# Patient Record
Sex: FEMALE | Race: WHITE | NOT HISPANIC OR LATINO | Employment: OTHER | URBAN - METROPOLITAN AREA
[De-identification: names, ages, dates, MRNs, and addresses within clinical notes are randomized per-mention and may not be internally consistent; named-entity substitution may affect disease eponyms.]

---

## 2018-08-02 ENCOUNTER — RECORDS - HEALTHEAST (OUTPATIENT)
Dept: LAB | Facility: CLINIC | Age: 68
End: 2018-08-02

## 2018-08-02 LAB
FASTING STATUS PATIENT QL REPORTED: NORMAL
HOMOCYSTEINE PLASMA - HISTORICAL: 13 UMOL/L (ref 0–13)
T4 FREE SERPL-MCNC: 0.9 NG/DL (ref 0.7–1.8)
TSH SERPL DL<=0.005 MIU/L-ACNC: 0.71 UIU/ML (ref 0.3–5)
VIT B12 SERPL-MCNC: 392 PG/ML (ref 213–816)

## 2018-08-04 LAB — COPPER SERPL-MCNC: 83 UG/DL (ref 80–155)

## 2018-08-05 LAB — METHYLMALONATE SERPL-SCNC: 0.17 UMOL/L (ref 0–0.4)

## 2018-08-06 LAB
ANNOTATION COMMENT IMP: NORMAL
CERULOPLASMIN SERPL-MCNC: 24 MG/DL (ref 23–53)
MISCELLANEOUS TEST DEPT. - HE HISTORICAL: NORMAL
PERFORMING LAB: NORMAL
SPECIMEN STATUS: NORMAL
TEST NAME: NORMAL
VIT A SERPL-MCNC: 0.42 MG/L (ref 0.3–1.2)
VITAMIN A (RETINYL PALMITATE): <0.02 MG/L (ref 0–0.1)

## 2018-08-08 LAB
ACHR BIND IGG+IGM SER IA-SCNC: NORMAL NMOL/L
AMPHIPHYSIN AB TITR SER: NEGATIVE TITER
CV2 IGG TITR SER: NEGATIVE TITER
GLIAL NUC TYPE 1 AB TITR SER: NEGATIVE TITER
HU1 AB TITR SER: NEGATIVE TITER
HU2 AB TITR SER IF: NEGATIVE TITER
HU3 AB TITR SER: NEGATIVE TITER
IMMUNOLOGIST REVIEW: NORMAL
LABORATORY COMMENT REPORT: NORMAL
NACHR AB SER-SCNC: 0 NMOL/L
PCA-1 AB TITR SER: NEGATIVE TITER
PCA-2 AB TITR SER: NEGATIVE TITER
PCA-TR AB TITR SER IF: NEGATIVE TITER
STRIA MUS AB TITR SER: NEGATIVE TITER
VGCC-N BIND AB SER-SCNC: 0 NMOL/L
VGCC-P/Q BIND AB SER-SCNC: 0 NMOL/L
VGKC AB SER-SCNC: 0 NMOL/L

## 2018-10-12 ENCOUNTER — RECORDS - HEALTHEAST (OUTPATIENT)
Dept: LAB | Facility: CLINIC | Age: 68
End: 2018-10-12

## 2018-10-15 LAB
ERYTHROCYTE [DISTWIDTH] IN BLOOD BY AUTOMATED COUNT: 12.4 % (ref 11–14.5)
HCT VFR BLD AUTO: 40.1 % (ref 35–47)
HGB BLD-MCNC: 12.6 G/DL (ref 12–16)
MCH RBC QN AUTO: 30.1 PG (ref 27–34)
MCHC RBC AUTO-ENTMCNC: 31.4 G/DL (ref 32–36)
MCV RBC AUTO: 96 FL (ref 80–100)
PLATELET # BLD AUTO: 195 THOU/UL (ref 140–440)
PMV BLD AUTO: 12.5 FL (ref 8.5–12.5)
RBC # BLD AUTO: 4.19 MILL/UL (ref 3.8–5.4)
WBC: 7 THOU/UL (ref 4–11)

## 2018-10-22 ENCOUNTER — RECORDS - HEALTHEAST (OUTPATIENT)
Dept: LAB | Facility: CLINIC | Age: 68
End: 2018-10-22

## 2018-10-22 LAB
ANION GAP SERPL CALCULATED.3IONS-SCNC: 8 MMOL/L (ref 5–18)
BUN SERPL-MCNC: 33 MG/DL (ref 8–22)
CALCIUM SERPL-MCNC: 9.1 MG/DL (ref 8.5–10.5)
CHLORIDE BLD-SCNC: 108 MMOL/L (ref 98–107)
CO2 SERPL-SCNC: 27 MMOL/L (ref 22–31)
CREAT SERPL-MCNC: 0.66 MG/DL (ref 0.6–1.1)
GFR SERPL CREATININE-BSD FRML MDRD: >60 ML/MIN/1.73M2
GLUCOSE BLD-MCNC: 77 MG/DL (ref 70–125)
POTASSIUM BLD-SCNC: 4.1 MMOL/L (ref 3.5–5)
SODIUM SERPL-SCNC: 143 MMOL/L (ref 136–145)

## 2018-12-10 ENCOUNTER — RECORDS - HEALTHEAST (OUTPATIENT)
Dept: LAB | Facility: CLINIC | Age: 68
End: 2018-12-10

## 2018-12-10 LAB
ERYTHROCYTE [SEDIMENTATION RATE] IN BLOOD BY WESTERGREN METHOD: 2 MM/HR (ref 0–20)
FOLATE SERPL-MCNC: 10.1 NG/ML
HIV 1+2 AB+HIV1 P24 AG SERPL QL IA: NEGATIVE
T4 FREE SERPL-MCNC: 1 NG/DL (ref 0.7–1.8)
TSH SERPL DL<=0.005 MIU/L-ACNC: 0.52 UIU/ML (ref 0.3–5)
VIT B12 SERPL-MCNC: 358 PG/ML (ref 213–816)

## 2018-12-11 LAB
25(OH)D3 SERPL-MCNC: 50.4 NG/ML (ref 30–80)
ANA SER QL: 0.1 U

## 2018-12-18 LAB
ACHR BIND IGG+IGM SER IA-SCNC: 0 NMOL/L
AMPHIPHYSIN AB TITR SER: NEGATIVE TITER
CV2 IGG TITR SER: NEGATIVE TITER
GLIAL NUC TYPE 1 AB TITR SER: NEGATIVE TITER
HU1 AB TITR SER: NEGATIVE TITER
HU2 AB TITR SER IF: NEGATIVE TITER
HU3 AB TITR SER: NEGATIVE TITER
IMMUNOLOGIST REVIEW: NORMAL
LABORATORY COMMENT REPORT: NORMAL
NACHR AB SER-SCNC: 0 NMOL/L
PCA-1 AB TITR SER: NEGATIVE TITER
PCA-2 AB TITR SER: NEGATIVE TITER
PCA-TR AB TITR SER IF: NEGATIVE TITER
STRIA MUS AB TITR SER: NEGATIVE TITER
VGCC-N BIND AB SER-SCNC: 0 NMOL/L
VGCC-P/Q BIND AB SER-SCNC: 0 NMOL/L
VGKC AB SER-SCNC: 0 NMOL/L

## 2019-04-23 ENCOUNTER — RECORDS - HEALTHEAST (OUTPATIENT)
Dept: LAB | Facility: CLINIC | Age: 69
End: 2019-04-23

## 2019-04-23 LAB
ANION GAP SERPL CALCULATED.3IONS-SCNC: 6 MMOL/L (ref 5–18)
BUN SERPL-MCNC: 21 MG/DL (ref 8–22)
CALCIUM SERPL-MCNC: 9.5 MG/DL (ref 8.5–10.5)
CHLORIDE BLD-SCNC: 103 MMOL/L (ref 98–107)
CO2 SERPL-SCNC: 29 MMOL/L (ref 22–31)
CREAT SERPL-MCNC: 0.64 MG/DL (ref 0.6–1.1)
GFR SERPL CREATININE-BSD FRML MDRD: >60 ML/MIN/1.73M2
GLUCOSE BLD-MCNC: 77 MG/DL (ref 70–125)
POTASSIUM BLD-SCNC: 4 MMOL/L (ref 3.5–5)
SODIUM SERPL-SCNC: 138 MMOL/L (ref 136–145)

## 2019-05-14 ENCOUNTER — TRANSFERRED RECORDS (OUTPATIENT)
Dept: HEALTH INFORMATION MANAGEMENT | Facility: CLINIC | Age: 69
End: 2019-05-14

## 2019-05-21 NOTE — TELEPHONE ENCOUNTER
RECORDS RECEIVED FROM: External - Dr. Sarah Freeman with [facility]    DATE RECEIVED: 7/23/19   NOTES (FOR ALL VISITS) STATUS DETAILS   OFFICE NOTE from referring provider Received 5/14/19  4/9/19  3/11/19   OFFICE NOTE from other specialist Received Patricia:  5/30/08-7/30/08   DISCHARGE SUMMARY from hospital N/A    DISCHARGE REPORT from the ER N/A    OPERATIVE REPORT N/A    MEDICATION LIST Received     IMAGING  (FOR ALL VISITS)     EMG N/A    EEG N/A    ECT N/A    MRI (HEAD, NECK, SPINE) N/A    LUMBAR PUNCTURE N/A    RYAN Scan N/A    CT (HEAD, NECK, SPINE) N/A       Action    Action Taken Records request faxed to [facility]

## 2019-05-22 NOTE — TELEPHONE ENCOUNTER
Action    Action Taken Records received from [facility] via fax     Per 3/11/19 OV, patient had workup done at [place]. Will fax a records request.

## 2019-05-23 NOTE — TELEPHONE ENCOUNTER
Action    Action Taken Jay requires KATHY from patient to release records. Will call patient       Phone Call:     Contact Name Karen Angel   Outcome Mail box full, not able to leave patient a voicemail.
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
## 2019-07-16 NOTE — TELEPHONE ENCOUNTER
Phone Call:     Contact Name Karenlinda Angel   Outcome Left patient a voicemail to obtain records from [facility]

## 2019-07-18 ENCOUNTER — DOCUMENTATION ONLY (OUTPATIENT)
Dept: CARE COORDINATION | Facility: CLINIC | Age: 69
End: 2019-07-18

## 2019-07-18 NOTE — TELEPHONE ENCOUNTER
Action    Action Taken KATHY received from patient's POA and sister Marge Angel via email.     Action    Action Taken Records request faxed to [facility]

## 2019-07-23 ENCOUNTER — OFFICE VISIT (OUTPATIENT)
Dept: NEUROLOGY | Facility: CLINIC | Age: 69
End: 2019-07-23
Payer: MEDICARE

## 2019-07-23 ENCOUNTER — PRE VISIT (OUTPATIENT)
Dept: NEUROLOGY | Facility: CLINIC | Age: 69
End: 2019-07-23

## 2019-07-23 ENCOUNTER — TELEPHONE (OUTPATIENT)
Dept: NEUROLOGY | Facility: CLINIC | Age: 69
End: 2019-07-23

## 2019-07-23 VITALS — OXYGEN SATURATION: 97 % | DIASTOLIC BLOOD PRESSURE: 77 MMHG | SYSTOLIC BLOOD PRESSURE: 153 MMHG | HEART RATE: 89 BPM

## 2019-07-23 DIAGNOSIS — R25.1 TREMOR: Primary | ICD-10-CM

## 2019-07-23 RX ORDER — CLONAZEPAM 0.5 MG/1
TABLET ORAL
COMMUNITY
End: 2020-01-09

## 2019-07-23 RX ORDER — CHOLECALCIFEROL (VITAMIN D3) 50 MCG
TABLET ORAL
COMMUNITY

## 2019-07-23 RX ORDER — GUAIFENESIN 600 MG/1
TABLET, EXTENDED RELEASE ORAL
COMMUNITY

## 2019-07-23 RX ORDER — TRAZODONE HYDROCHLORIDE 100 MG/1
TABLET ORAL
COMMUNITY

## 2019-07-23 RX ORDER — CITALOPRAM HYDROBROMIDE 20 MG/1
20 TABLET ORAL DAILY
COMMUNITY

## 2019-07-23 RX ORDER — CARBIDOPA AND LEVODOPA 25; 100 MG/1; MG/1
TABLET ORAL
COMMUNITY
End: 2019-09-11

## 2019-07-23 ASSESSMENT — PAIN SCALES - GENERAL: PAINLEVEL: NO PAIN (0)

## 2019-07-23 ASSESSMENT — UNIFIED PARKINSONS DISEASE RATING SCALE (UPDRS): PARKINSONS_MEDS: ON

## 2019-07-23 NOTE — NURSING NOTE
Chief Complaint   Patient presents with     Consult     [facility] NEW - ABNORMAL MOVEMENT, MS AND DEMENTIA       Kashmir Alves, EMT

## 2019-07-23 NOTE — PATIENT INSTRUCTIONS
Plan:  - We will obtain the [facility] MRI images (not just report) of patient's brain and spine (cervical, thoracic, lumbar, if completed).  - We will obtain [facility] records.  - Stop CD/LD (Sinemet) 25/100 mg. Follow the instructions below.   - Stop taking the 8 pm dose x 5 days, then stop the 4 pm dose x 5 days, then stop the 12 pm dose x 5 days, then stop the 8 am dose.  - Referral to our Neurology Pharmacist for medication management.     We will see you back in one month.

## 2019-07-23 NOTE — TELEPHONE ENCOUNTER
Called the [facility] and left a message for imaging/records requesting a call back.

## 2019-07-23 NOTE — LETTER
"[facility]  [facility] NEUR[facility]  [address] [address]  [phone]            2019          Dear Kalyani Proxy Patient,    We received a request to activate you as a proxy for another patient of [facility] or [place].  In order to do so, we need to activate your [employer] account as well.    Your access code is: [unfilled]      Please access the [employer] website:  -  [employer] [email or web address]  -  [employer] [email or web address].    Below the ID and password fields, select the \"Sign Up Now\" as New User.  You will be prompted to enter the access code listed above as well as additional personal information.  Please follow the directions carefully when creating your username and password.    Once your account is activated, you can access the proxy accounts under \"Shared Medical Records\".    If you allow your access code to , or if you have any questions please call a [employer] Representative during normal clinic hours.     Sincerely,        [employer] Customer Service    "

## 2019-07-23 NOTE — TELEPHONE ENCOUNTER
Jeanie from imaging called me back and informed me she will push her cervical spine and brain MRI images to [facility].    Dena in the film room will be watching out for the images. Dena stated I should call tomorrow if the images do not come in today.
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
## 2019-07-24 ENCOUNTER — MYC MEDICAL ADVICE (OUTPATIENT)
Dept: NEUROLOGY | Facility: CLINIC | Age: 69
End: 2019-07-24

## 2019-07-29 ENCOUNTER — DOCUMENTATION ONLY (OUTPATIENT)
Dept: OTHER | Facility: CLINIC | Age: 69
End: 2019-07-29

## 2019-08-08 NOTE — PROGRESS NOTES
"SUBJECTIVE/OBJECTIVE:                           Karen Angel is a 69 year old female called for an initial visit for Medication Therapy Management.  She was referred to me from Dr. Sevilla. Visit conducted with sister, Marge.    Chief Complaint: Initial MTM visit    Allergies/ADRs: None  Tobacco: History of tobacco dependence - quit date was when she was diagnosed with MS  Alcohol: none  Caffeine: caffeine-free diet soda as needed  Activity: walking  PMH: Reviewed in Epic    Medication Adherence/Access:  Patient has assistance with medication administration: assisted living, [facility] - [facility] -[phone]  Patient takes medications 4 time(s) per day.   Per patient, misses medication 0 times per week.   Medication barriers: none.   The patient fills medications at [facility]: NO, fills medications at unknown pharmacy (need to confirm with nursing staff)    Primary progressive MS: Not currently taking any medications. She sees a neurologist at [facility]. The patient is not currently in physical therapy but has been able to walk in her care facility. The hope is that she can continue to live in Assisted Living and not have to move to a Nursing Home yet.     Tremor: Currently tapering carbiodpa-levodopa er visit with Dr. Sevilla in July, as it was determined that the tremor is not due to PD. Sister states their goal is to at least reduce her hand tremor as they were told the head tremor may be more difficult to treat. Sister states the patient is able to care for herself (ie: dress and feed herself). Sister stated: \"The longer she can feed herself the better.\"    Anxiety:  Current medications include: Citalopram 20 mg once daily and clonazepam 0.25 mg twice daily. Sister thinks she has been on citalopram many years and she is unsure if it has been effective.     Insomnia: Currently taking trazodone 100 mg nightly. Sister states that she has had ongoing problems with sleep and she " knows that the staff at Assisted Living still mention this but sister is unsure of what the problems are exactly.     Urinary incontinence: No current medications. Wears adult diapers. Sister doesn't think she's been on medications for incontinence.     Vitamin D deficiency: Taking Vitamin D3 2000 units daily. No lab results available.     ASSESSMENT:                             Current medications were reviewed today as discussed above.     Medication Adherence: excellent, no issues identified    Primary progressive MS: Appears stable. Seems primary concern at this time is tremor.    Tremor: Needs improvement. Patient will continue to taper off levodopa and medications for essential tremor such as primidone or propranolol will be considered at next neurology visit.     Anxiety:  Needs improvement. Patient may benefit from optimization of antidepressants to avoid daily use of benzodiazepines. Will discuss with assisted living staff if this is really how she has been taking clonazepam.      Insomnia: Needs improvement. More information needed from assisted living staff.     Urinary incontinence: Needs improvement. It is possible that the patient may benefit from a trial of mirabegron for incontinence. Would avoid anticholinergics given dementia.     Vitamin D deficiency: Unable to asssess.     PLAN:                            1. Patient will continue to taper off of carbiodpa-levodopa and consider trial of propranolol or primidone at next neurology visit.   2. [facility] pharmacist left message for [facility] RN at assisted living facility to discuss her medications further, especially in regards to anxiety and insomnia.      Future considerations:   1. Trial of Myrbetriq.   2. Decrease clonazepam to as-needed use.    I spent 20 minutes with this patient today. I offer these suggestions for consideration by Dr. Gutierres. A copy of the visit note was provided to the patient's referring provider.    Will follow up in 6  weeks: 9/18/19.    The patient declined a summary of these recommendations as an after visit summary.     Alba Phillips, Pharm.D.  Medication Therapy Management Pharmacist  Phone: [phone]

## 2019-08-09 ENCOUNTER — ALLIED HEALTH/NURSE VISIT (OUTPATIENT)
Dept: PHARMACY | Facility: CLINIC | Age: 69
End: 2019-08-09
Payer: MEDICAID

## 2019-08-09 DIAGNOSIS — E55.9 VITAMIN D DEFICIENCY: ICD-10-CM

## 2019-08-09 DIAGNOSIS — G35 MS (MULTIPLE SCLEROSIS) (H): Primary | ICD-10-CM

## 2019-08-09 DIAGNOSIS — G47.00 INSOMNIA, UNSPECIFIED TYPE: ICD-10-CM

## 2019-08-09 DIAGNOSIS — R25.1 TREMOR: ICD-10-CM

## 2019-08-09 DIAGNOSIS — R32 URINARY INCONTINENCE, UNSPECIFIED TYPE: ICD-10-CM

## 2019-08-09 DIAGNOSIS — F41.9 ANXIETY: ICD-10-CM

## 2019-08-09 PROCEDURE — 99605 MTMS BY PHARM NP 15 MIN: CPT | Performed by: PHARMACIST

## 2019-08-09 PROCEDURE — 99607 MTMS BY PHARM ADDL 15 MIN: CPT | Performed by: PHARMACIST

## 2019-08-09 NOTE — Clinical Note
I had a MTM visit with the patient's sister. She didn't know much about the patient's medications so I'm still awaiting a call back from assisted living staff to discuss further how things are going with the taper off Sinemet, as well as to discuss anxiety/insomnia. I know she is seeing Dr. Gutierres in August and then I will have a call with the sister again in September.

## 2019-09-11 ENCOUNTER — OFFICE VISIT (OUTPATIENT)
Dept: NEUROLOGY | Facility: CLINIC | Age: 69
End: 2019-09-11
Payer: MEDICARE

## 2019-09-11 VITALS
SYSTOLIC BLOOD PRESSURE: 166 MMHG | HEART RATE: 73 BPM | DIASTOLIC BLOOD PRESSURE: 100 MMHG | RESPIRATION RATE: 16 BRPM | OXYGEN SATURATION: 95 %

## 2019-09-11 DIAGNOSIS — G35 MULTIPLE SCLEROSIS (H): ICD-10-CM

## 2019-09-11 DIAGNOSIS — R25.1 TREMOR: ICD-10-CM

## 2019-09-11 DIAGNOSIS — R25.1 TREMOR: Primary | ICD-10-CM

## 2019-09-11 LAB
ALBUMIN SERPL-MCNC: 4.2 G/DL (ref 3.4–5)
ALP SERPL-CCNC: 66 U/L (ref 40–150)
ALT SERPL W P-5'-P-CCNC: 16 U/L (ref 0–50)
ANION GAP SERPL CALCULATED.3IONS-SCNC: 4 MMOL/L (ref 3–14)
AST SERPL W P-5'-P-CCNC: 15 U/L (ref 0–45)
BILIRUB SERPL-MCNC: 0.4 MG/DL (ref 0.2–1.3)
BUN SERPL-MCNC: 31 MG/DL (ref 7–30)
CALCIUM SERPL-MCNC: 9.2 MG/DL (ref 8.5–10.1)
CHLORIDE SERPL-SCNC: 105 MMOL/L (ref 94–109)
CO2 SERPL-SCNC: 28 MMOL/L (ref 20–32)
CREAT SERPL-MCNC: 0.68 MG/DL (ref 0.52–1.04)
ERYTHROCYTE [DISTWIDTH] IN BLOOD BY AUTOMATED COUNT: 12.4 % (ref 10–15)
GFR SERPL CREATININE-BSD FRML MDRD: 89 ML/MIN/{1.73_M2}
GLUCOSE SERPL-MCNC: 87 MG/DL (ref 70–99)
HCT VFR BLD AUTO: 44.4 % (ref 35–47)
HGB BLD-MCNC: 13.4 G/DL (ref 11.7–15.7)
MCH RBC QN AUTO: 29.6 PG (ref 26.5–33)
MCHC RBC AUTO-ENTMCNC: 30.2 G/DL (ref 31.5–36.5)
MCV RBC AUTO: 98 FL (ref 78–100)
PLATELET # BLD AUTO: 197 10E9/L (ref 150–450)
POTASSIUM SERPL-SCNC: 4 MMOL/L (ref 3.4–5.3)
PROT SERPL-MCNC: 7.2 G/DL (ref 6.8–8.8)
RBC # BLD AUTO: 4.52 10E12/L (ref 3.8–5.2)
SODIUM SERPL-SCNC: 138 MMOL/L (ref 133–144)
WBC # BLD AUTO: 8.6 10E9/L (ref 4–11)

## 2019-09-11 RX ORDER — PRIMIDONE 50 MG/1
TABLET ORAL
Qty: 180 TABLET | Refills: 3 | Status: SHIPPED | OUTPATIENT
Start: 2019-09-11 | End: 2022-08-16

## 2019-09-11 ASSESSMENT — PATIENT HEALTH QUESTIONNAIRE - PHQ9: SUM OF ALL RESPONSES TO PHQ QUESTIONS 1-9: 0

## 2019-09-11 ASSESSMENT — PAIN SCALES - GENERAL: PAINLEVEL: NO PAIN (0)

## 2019-09-11 NOTE — PATIENT INSTRUCTIONS
Plan:   - Trial primidone 75 mg one tab at bedtime.   - Start 0.5 tab for 2 weeks, then increase to one tab for 2 weeks, then increase to 1.5 tab and continue on this dose.   - May stop at dose that improves your tremors.   - Check CBC and CMP today. Will need to do blood work every 3 - 6 months to monitor your liver & kidney function and complete blood count.   - Repeat MRI brain with/without contrast.  - Multiple Sclerosis referral to Dr. Irais Wilson for management of multiple sclerosis   - May stop clonazepam if it is not improving symptoms.     I will see you back in 3 months.

## 2019-09-11 NOTE — PROGRESS NOTES
Department of Neurology  Movement Disorders Division     Patient: Karen Angel   MRN: [id number]   : 1950   Date of Visit: 2019     Reason for visit: follow up for tremors    History of Present Illness  Ms. Angel is a 69 year old R handed female that presents to Neurology Movement clinic for follow up regarding proximal tremors. Patient was last seen on 2019 where CD/LD was tapered off as her tremors were not consistent with PD. Further information was needed at that time, including MRI brain, prior to establishing a diagnosis.     History obtained from patient and accompanied by her sister.  Her tremors.  Tremors are mostly in her hands as well as her head and are consistent.  They are not distractible and she is unable to control them.  She is not aware of a pattern to her tremors.  She is currently off of all carbidopa/levodopa.    Patient reports no change in patient reports following with Dr. Sears at [facility], [facility] for management of her multiple sclerosis.  She states that she is currently not on any treatment for her MS.    Review of Systems:  Other than that mentioned above, the remainder of 12 systems reviewed were negative.    Medications:  Current Outpatient Medications   Medication Sig Dispense Refill     citalopram (CELEXA) 20 MG tablet Take 20 mg by mouth daily Take 1 tablet by mouth daily @@ 8am       clonazePAM (KLONOPIN) 0.5 MG tablet Take 1/2 tablet by mouth twice a day @@ 8am and 4pm       guaiFENesin (MUCINEX) 600 MG 12 hr tablet Take 1 tablet twice a day as needed       primidone (MYSOLINE) 50 MG tablet Start with 0.5 tab x 2 weeks, increase to 1 tab x 2 weeks, increase to 1.5 tab and continue on this dose 180 tablet 3     traZODone (DESYREL) 100 MG tablet Take 1 tablet by mouth with food or snack @@ 8pm       vitamin D3 (CHOLECALCIFEROL) 2000 units (50 mcg) tablet Take 1 tablet by mouth daily @@ 8am           Physical Exam:  The patient's   blood pressure is 166/100 (abnormal) and her pulse is 73. Her respiration is 16 and oxygen saturation is 95%.    Gen: alert, active, attentive, appropriately groomed   HEENT: normocephalic, eyes open with no discharge, nares patent, oropharynx clear-no lesions  Chest: normal configuration, chest rise equal b/l, non labored breathing   Extremities: no edema/cyanosis in BUE/BLE, distal pulses intact  Psych: mood stable      Neurologic Exam:  Mental status: Alert and oriented to person, but not place ([place]), time (2009), or situation. Follows commands. Recent and remote memory not intact. Attention span and concentration intact. Fund of knowledge intact to current events. Able to recall 0/3 objects (1/3 with cue). Able to recall current president and unable to recall past presidents. Unable to spell WORLD backwards (TEODORO). Able to name an object and describe its function (pen).  Speech: Fluent, intact comprehension and articulation  CN: visual fields intact in all fields, EOMIB,  no nystagmus, normal saccades, PERRL, facial sensation intact, facial movement symmetric,facial expression normal, hearing grossly intact to conversation, tongue protrudes midline   Motor: Moves all extremities equally against gravity without difficulty, increased axial tone observed with distraction and paratonia observed in BUE/BLE, posturing observed in L hand  Ideomotor apraxia on LUE, right/left confusion, double simultaneous extinction not intact  Finger taps normal b/l  Slight slowing of hand opening and closing b/l  Deborah normal b/l  Leg agility/toe taps normal b/l  Body bradykinesia normal  Reflexes (R/L): 3/2  in biceps, brachioradialis, triceps, patellars, achilles; no clonus  Sensation: intact to light touch throughout   Coordination: grossly intact with FTN, HTS; unable to perform figure 8 with both feet  Gait: unable to rise unassisted from a seated position requires arms to push self up, no en bloc turns, no ataxia, stopped  "posture, uses roller walker, decreased ADF on R     Tremor evaluation -   Head: persistent head titubation with \"no-no\" tremor  Face: none  Voice: slight voice tremor with \"Eee\" and \"Ahhh\"  Posture: proximal moderate amplitude tremor best seen in the batwing position   Kinetic: intention tremor when reaching target  Overflow tremor observed from head to BUE  No rest tremor observed   Lower limb (R; L): none    Data Reviewed:   - MRI brain images incomplete, though, of what can be observed there is diffuse white matter hyperintensity that appear similar to confluent white matter changes secondary to MS or leukoencephalopathy. Discussed imaging with Dr. Irais Wilson that felt that MRI imaging was suggestive of Secondary Progressive MS.     Impression:  Karen Angel is a 69 year old female with a history of multiple sclerosis that presents to clinic for follow-up of tremors.  MRI imaging reveals confluent white matter changes which may be secondary to multiple sclerosis.  Imaging findings were discussed with Dr. Irais Wilson who felt that the burden of white matter changes could represent secondary progressive multiple sclerosis.  Exam reveals proximal postural and intention tremor involving bilateral upper extremities, left > right, that is consistent with a cerebellar tremor of Multiple Sclerosis. Unfortunately, these types of tremors are difficulty to control. Despite this, will trial primidone and observe for a response.      1. Cerebellar tremors of Multiple Sclerosis involving proximal BUE, L > R, and head titubation. Current medications may also be contributing to tremor, namely trazodone and citalopram.   2. Multiple Sclerosis     Plan:   - Trial primidone 75 mg one tab qday   - Start 0.5 tab for 2 weeks, then increase to one tab for 2 weeks, then increase to 1.5 tab and continue on this dose.  - Check CBC and CMP today. Will need to do blood work every 3 - 6 months to monitor liver & kidney function and " complete blood count while on primidone.    - Above levels WNL  - Repeat MRI brain with/without contrast.  - Multiple Sclerosis referral to Dr. Irais Wilson for management of multiple sclerosis - patient is amenable to a second opinion regarding treatment   - May stop clonazepam if not improving symptoms.     RTC: 2 months     Jolie Gutierres DO  Movement Disorders Fellow    Patient discussed with Dr. Damian.

## 2019-09-11 NOTE — NURSING NOTE
Chief Complaint   Patient presents with     RECHECK     [facility] RETURN MOVEMENT DISORDER 1 MO F/U    Dilma Huff CMA

## 2019-09-18 ENCOUNTER — ALLIED HEALTH/NURSE VISIT (OUTPATIENT)
Dept: PHARMACY | Facility: CLINIC | Age: 69
End: 2019-09-18
Payer: MEDICAID

## 2019-09-18 ENCOUNTER — TELEPHONE (OUTPATIENT)
Dept: NEUROLOGY | Facility: CLINIC | Age: 69
End: 2019-09-18

## 2019-09-18 DIAGNOSIS — R25.1 TREMOR: ICD-10-CM

## 2019-09-18 DIAGNOSIS — G35 MS (MULTIPLE SCLEROSIS) (H): Primary | ICD-10-CM

## 2019-09-18 PROCEDURE — 99606 MTMS BY PHARM EST 15 MIN: CPT | Performed by: PHARMACIST

## 2019-09-18 NOTE — Clinical Note
I have not been able to help much with medications because the sister doesn't know what the patient is taking and I have been unable to reach anyone at the patient's assisted living to assess whether they started primidone and how it's going. I can continue to have monthly calls with the sister to hear her perspective, but this is getting challenging for us! I did give the sister the phone number to schedule with Irais Wilson so it appears that has been done.Alba

## 2019-09-18 NOTE — PROGRESS NOTES
SUBJECTIVE/OBJECTIVE:                Karen Angel is a 69 year old female called for a follow-up visit for Medication Therapy Management.  She was referred to me from Dr. Sevilla. Visit conducted with sister, Marge, as the patient has dementia.     Chief Complaint: Follow up from our visit on 8/9/19  Tobacco: History of tobacco dependence - quit date when dx with MS  Alcohol: none    Medication Adherence/Access: no issues reported - medications administered by [facility]. Medication nurse is no longer working there and the  at the facility is Manuel Lala ([phone]).     Primary progressive MS: Not currently taking any medications. She was referred to a MS specialist at the [facility] and recommended to have another MRI, but these appointments have not been scheduled.      Tremor: Unclear if patient is taking primidone. It was prescribed by Dr. Gutierres in Movement Disorders on 9/11/19, but sister has been unable to reach the medication nurse at [facility] and there has been staff turnover. Primary goal with primidone is so that the patient can continue to feed herself and remain at this level of care. The patient did not have any beneficial effect from levodopa in the past and recently tapered off.      ASSESSMENT:              Current medications were reviewed today as discussed above.      Medication Adherence: unable to assess - called [facility] and left 2 voicemails; no response    Primary progressive MS: Unable to assess. Provided sister with neurology phone number for scheduling appointment with Dr. Irais Wilson to establish MS care.      Tremor: Unable to assess. Unclear if patient is on primidone yet and I have been unable to reach staff at the patient's assisted living facility.      PLAN:                  MTM Pharmacist has left 2 voicemails at patient's assisted living, one with a previous nurse who left and a second with the facility  .     I spent 15 minutes with this patient today. I offer these suggestions for consideration by Dr. Gutierres/Dr. Sevilla. A copy of the visit note was provided to the patient's referring provider.     Will follow up in 1 month - 10/16/19.    The patient declined a summary of these recommendations as an after visit summary.    Alba Phillips, Pharm.D.  Medication Therapy Management Pharmacist  Phone: [phone]

## 2019-09-18 NOTE — TELEPHONE ENCOUNTER
[facility] Call Center    Phone Message    May a detailed message be left on voicemail: yes    Reason for Call: Other: Pt sister called in needsapt scheduled with Dr Wilson per referral but when trying to schedule it states no template available? Please call back to schedule      Action Taken: Message routed to:  [facility] neuro

## 2019-09-25 NOTE — TELEPHONE ENCOUNTER
RECORDS RECEIVED FROM: Internal - Dr Roel Day neuro   DATE RECEIVED: 10/10/19   NOTES (FOR ALL VISITS) STATUS DETAILS   OFFICE NOTE from referring provider Internal 9/11/19 7/23/19   OFFICE NOTE from other specialist Received [facility]:  5/14/19  4/9/19  3/11/19   DISCHARGE SUMMARY from hospital N/A    DISCHARGE REPORT from the ER N/A    OPERATIVE REPORT N/A    MEDICATION LIST Internal    IMAGING  (FOR ALL VISITS)     EMG N/A    EEG N/A    ECT N/A    MRI (HEAD, NECK, SPINE) Internal [facility]:  MRI Brain 9/30/19 *scheduled*   LUMBAR PUNCTURE N/A    RYAN Scan N/A    CT (HEAD, NECK, SPINE) N/A

## 2019-09-30 ENCOUNTER — HOSPITAL ENCOUNTER (OUTPATIENT)
Dept: MRI IMAGING | Facility: CLINIC | Age: 69
Discharge: HOME | End: 2019-09-30
Attending: PSYCHIATRY & NEUROLOGY | Admitting: PSYCHIATRY & NEUROLOGY
Payer: MEDICARE

## 2019-09-30 DIAGNOSIS — R25.1 TREMOR: ICD-10-CM

## 2019-09-30 DIAGNOSIS — G35 MULTIPLE SCLEROSIS (H): ICD-10-CM

## 2019-09-30 PROCEDURE — 25500064 ZZH RX 255 OP 636: Performed by: PSYCHIATRY & NEUROLOGY

## 2019-09-30 PROCEDURE — A9585 GADOBUTROL INJECTION: HCPCS | Performed by: PSYCHIATRY & NEUROLOGY

## 2019-09-30 PROCEDURE — 70553 MRI BRAIN STEM W/O & W/DYE: CPT

## 2019-09-30 RX ORDER — GADOBUTROL 604.72 MG/ML
7 INJECTION INTRAVENOUS ONCE
Status: COMPLETED | OUTPATIENT
Start: 2019-09-30 | End: 2019-09-30

## 2019-09-30 RX ADMIN — GADOBUTROL 7 ML: 604.72 INJECTION INTRAVENOUS at 10:41

## 2019-10-10 ENCOUNTER — OFFICE VISIT (OUTPATIENT)
Dept: NEUROLOGY | Facility: CLINIC | Age: 69
End: 2019-10-10
Attending: PSYCHIATRY & NEUROLOGY
Payer: MEDICARE

## 2019-10-10 ENCOUNTER — PRE VISIT (OUTPATIENT)
Dept: NEUROLOGY | Facility: CLINIC | Age: 69
End: 2019-10-10

## 2019-10-10 VITALS — WEIGHT: 136.1 LBS | SYSTOLIC BLOOD PRESSURE: 168 MMHG | HEART RATE: 58 BPM | DIASTOLIC BLOOD PRESSURE: 81 MMHG

## 2019-10-10 DIAGNOSIS — F02.80 DEMENTIA DUE TO MEDICAL CONDITION WITHOUT BEHAVIORAL DISTURBANCE (H): Primary | ICD-10-CM

## 2019-10-10 DIAGNOSIS — G35 MULTIPLE SCLEROSIS (H): ICD-10-CM

## 2019-10-10 RX ORDER — MEMANTINE HYDROCHLORIDE 10 MG/1
10 TABLET ORAL 2 TIMES DAILY
Qty: 60 TABLET | Refills: 3 | Status: SHIPPED | OUTPATIENT
Start: 2019-10-10 | End: 2021-09-01

## 2019-10-10 ASSESSMENT — PAIN SCALES - GENERAL: PAINLEVEL: NO PAIN (0)

## 2019-10-10 NOTE — PATIENT INSTRUCTIONS
Increase primidone to 1.5 tablets a day for tremor  We will start Namenda but would establish a BICAMS baseline first.    She will be referred to see Ashley for a CARE visit to obtain BICAMS scores then start Namenda 10 mg po BID and retest BICAMS ONLY 3 months after starting NAMENDA (Senna).    Alpha lipoc acid 600-900 mg every day.    RTC in 4 months.

## 2019-10-10 NOTE — LETTER
10/10/2019       RE: Karen Angel  [address]  [place] [address]     Dear Colleague,    Thank you for referring your patient, Karen Angel, to the [facility] MULTIPLE SCLEROSIS at [facility]. Please see a copy of my visit note below.    THE [facility] MULTIPLE SCLEROSIS CLINIC  NEW PATIENT EVALUATION/CONSULTATION    Referral source:   [address] / [facility] [address]        PRINCIPAL NEUROLOGIC DIAGNOSIS: Multiple Sclerosis PP    DISEASE SUMMARY  Date of onset:  (age 50-51)  Date of diagnosis of MS:   Disease course at onset: Relapsing Remitting  Current disease course: Relapsing Remitting  Previous disease therapies: None  Current disease therapy: None  Most recent MRI brain: 19  Most recent MRI cervical spine:   CSF:         HISTORY OF ILLNESS:    An opinion on this 69 year old right handed genetic female  was requested by Dr. Jolie Gutierres for Management of MS. The patient was accompanied by sister.     Karen Angel has  no significant PMH except for a Hx of Migraines, she carries the diagnosis of MS since . The Hx. Is provided mostly by her sister since the patient has severe cognitive impairment but she does remember some events and facts. The sister states they did not have a lot of contact before she (the patient) moved in with their Mom in [place] ([facility]) in , after their father . At that time she seemed withdrawn, her sister says 'something was not right'. Karen worked partime at [employer] for a while but she later quit and could not find a job, unclear why. Her sister saw her occasionally over the next 8 years and noticed things were getting worse such as her balance and memory, she did not have insurance at the time so did not get much medical care. Aproximately 10 years ago her sister who lives in [place] received a call that Karen was at the [facility].  Apparently the policed pulled her over for driving erratically, she did not remember that ther mother was in [place] and was looking for the nursing home. The doctor at [facility] told her she needed to come pick her up but when her sister said something was wrong, they did a work up and she was diagnosed with MS, she was at [facility] for over 2 months, she was discharged to a nursing home for one year, then an assisted living facility in [place] and soon after she was diagnosed with Parkinson's disease. She has moved to different facilities in the past 8 years and has gotten worse, especially her tremors, she started using a walker 2 years ago due to worsening balance. She is currently at an assisted living in [facility] and has been there for 2 years. Her mother passed away 8 years ago. 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     More recently she went to [facility] clinic of Neurology where they said it was not Parkinson's and referred her to [facility], she saw Dr. Tucker then transferred to Dr. Damian who saw her 9-11-19 and prescribed Primidone for the head and hand tremor and referred her to her MS clinic.      Current Symptoms:  1. Cognitive impairment  2. Cerebellar Outflow Tremor (hand and head)  3. Balance issues        PAST HISTORY:  Past Medical History:   Diagnosis Date     Abnormal brain MRI 9/30/2019    Narrative & Impression   MRI BRAIN WITHOUT AND WITH CONTRAST  9/30/2019 11:17 AM    HISTORY:  Neuro deficit(s), subacute. Tremor. Multiple sclerosis (H).   TECHNIQUE:  Multiplanar, multisequence MRI of the brain without and with 7 mL Gadavist.   COMPARISON: Outside MRI head 10/22/2018.   FINDINGS: There is no restricted diffusion to suggest acute infarct. Confluent areas of T2/T2 FLAIR signal abno     Dementia (H)      Migraine      Multiple sclerosis, primary progressive (H)        Past Surgical History:   Procedure Laterality Date     CHOLECYSTECTOMY        HYSTERECTOMY                Current Outpatient Prescriptions:  Current Outpatient Medications   Medication     citalopram (CELEXA) 20 MG tablet     clonazePAM (KLONOPIN) 0.5 MG tablet     guaiFENesin (MUCINEX) 600 MG 12 hr tablet     primidone (MYSOLINE) 50 MG tablet     traZODone (DESYREL) 100 MG tablet     vitamin D3 (CHOLECALCIFEROL) 2000 units (50 mcg) tablet     No current facility-administered medications for this visit.           ALLERGIES     No Known Allergies      Social History    Social History     Socioeconomic History     Marital status: Single     Spouse name: Not on file     Number of children: Not on file     Years of education: Not on file     Highest education level: Not on file   Occupational History     Occupation: unemployed   Social Needs     Financial resource strain: Not on file     Food insecurity:     Worry: Not on file     Inability: Not on file     Transportation needs:     Medical: Not on file     Non-medical: Not on file   Tobacco Use     Smoking status: Former Smoker     Packs/day: 1.00     Years: 20.00     Pack years: 20.00     Smokeless tobacco: Never Used   Substance and Sexual Activity     Alcohol use: Not Currently     Drug use: Not on file     Sexual activity: Not on file   Lifestyle     Physical activity:     Days per week: Not on file     Minutes per session: Not on file     Stress: Not on file   Relationships     Social connections:     Talks on phone: Not on file     Gets together: Not on file     Attends Sikh service: Not on file     Active member of club or organization: Not on file     Attends meetings of clubs or organizations: Not on file     Relationship status: Not on file     Intimate partner violence:     Fear of current or ex partner: Not on file     Emotionally abused: Not on file     Physically abused: Not on file     Forced sexual activity: Not on file   Other Topics Concern     Not on file   Social History Narrative    Single. Lives in [facility]  [facility] an assisted living facility sine 2014.     Prior to her hospitalization in 2008 she worked as a  that afterward at [employer].        She attended the [employer] for 2 years.         FAMILY HISTORY     Family History   Problem Relation Age of Onset     Myocardial Infarction Mother      Hypertension Mother      Heart Failure Father          REVIEW OF SYSTEMS:    Comprehensive review of systems otherwise was negative, including constitutional, head and neck, cardiovascular, pulmonary, gastrointestinal, endocrine, urologic, reproductive, rheumatic, hematologic, immunologic, dermatologic, and psychiatric.    Nutritional concerns: None  Driving issues: None   Safety concerns regarding living situations and safety at home: None  Risk of falls: None  Pain: None    Neurologic Exam:  Mental status: Alert and oriented to person, but not place ([place]), time (2009), or situation. Follows commands. Recent and remote memory not intact. Attention span and concentration intact. Fund of knowledge intact to current events. Able to recall 0/3 objects (1/3 with cue). Able to recall current president and unable to recall past presidents. Unable to spell WORLD backwards (TEODORO). Able to name an object and describe its function (pen).  Speech: Fluent, intact comprehension and articulation  CN: visual fields intact in all fields, EOMIB,  no nystagmus, normal saccades, PERRL, facial sensation intact, facial movement symmetric,facial expression normal, hearing grossly intact to conversation, tongue protrudes midline   Motor: Moves all extremities equally against gravity without difficulty, increased axial tone observed with distraction and paratonia observed in BUE/BLE, posturing observed in L hand  Ideomotor apraxia on LUE, right/left confusion, double simultaneous extinction not intact  Finger taps normal b/l  Slight slowing of hand opening and closing b/l  Deborah normal b/l  Leg agility/toe taps normal  "b/l  Body bradykinesia normal  Reflexes (R/L): 3/2  in biceps, brachioradialis, triceps, patellars, achilles; no clonus  Sensation: intact to light touch throughout   Coordination: grossly intact with FTN, HTS; unable to perform figure 8 with both feet  Gait: unable to rise unassisted from a seated position requires arms to push self up, no en bloc turns, no ataxia, stopped posture, uses roller walker, decreased ADF on R     Tremor evaluation -   Head: persistent head titubation with \"no-no\" tremor  Face: none  Voice: slight voice tremor with \"Eee\" and \"Ahhh\"  Posture: proximal moderate amplitude tremor best seen in the batwing position   Kinetic: intention tremor when reaching target  Overflow tremor observed from head to BUE  No rest tremor observed   Lower limb (R; L): none    REVIEW OF IMAGING STUDIES:    I personally reviewed the following images:    MRI brain images incomplete, though, of what can be observed there is diffuse white matter hyperintensity that appear similar to confluent white matter changes suggestive of SPMS    ASSESSMENT/PLAN:    Increase primidone to 1.5 tablets a day for tremor  We will start Namenda but would establish a BICAMS baseline first.    She will be referred to see Ashley for a CARE visit to obtain BICAMS scores then start Namenda 10 mg po BID (5 at bedtime x 2 weeks and increase by 5 mg every 2 weeks until reaching goal),  repeat BICAMS ONLY 3 months after starting NAMENDA (Senna).    Alpha lipoc acid 600-900 mg every day.      Finally I will follow the patient up in 4 month(s) as long as the patient is doing well. I instructed the patient to call or mychart my office with any concerns or questions.    I spent 90 minutes in this visit, with >50% direct patient time spent counseling about prognosis, treatment options, and coordination of care.     My recommendations will be communicated back to the patient's physician(s) by mail.  Follow-up is expected to be with me.      Irais" Steve DELUNA  Chief, Multiple Sclerosis Division  Department of Neurology  [facility] [place]      (Chart documentation was completed in part with Dragon voice-recognition software. Even though reviewed, some grammatical, spelling, and word errors may remain.)       Again, thank you for allowing me to participate in the care of your patient.      Sincerely,    Irais Wilson MD

## 2019-10-16 ENCOUNTER — ALLIED HEALTH/NURSE VISIT (OUTPATIENT)
Dept: PHARMACY | Facility: CLINIC | Age: 69
End: 2019-10-16
Payer: MEDICAID

## 2019-10-16 DIAGNOSIS — G35 MS (MULTIPLE SCLEROSIS) (H): Primary | ICD-10-CM

## 2019-10-16 DIAGNOSIS — R25.1 TREMOR: ICD-10-CM

## 2019-10-16 PROCEDURE — 99606 MTMS BY PHARM EST 15 MIN: CPT | Performed by: PHARMACIST

## 2019-10-16 NOTE — PROGRESS NOTES
SUBJECTIVE/OBJECTIVE:                Karen Angel is a 69 year old female called for a follow-up visit for Medication Therapy Management.  She was referred to me from Dr. Sevilla. Visit was conducted with sister, Marge, as the patient has dementia.     Chief Complaint: Follow up from [facility] visit on 9/18/18  Tobacco:  reports that she has quit smoking. She has a 20.00 pack-year smoking history. She has never used smokeless tobacco.  Alcohol: Social History    Substance and Sexual Activity      Alcohol use: Not Currently    Medication Adherence/Access: no issues reported but sister is still unable to comment on exactly how the patient is getting medications in the assisted living facility and we have been unable to reach staff at the assisted living    Primary progressive MS: Not currently taking any medications. Recently was seen by Dr. Wilson, MS neurologist at [facility], who recommended BICAMS baseline assessment and then a trial of memantine.     Tremor: Currently taking primidone 75 mg daily. Sister states she has not noticed any change in the patient's tremor since starting primidone.     ASSESSMENT:                Medication Adherence: excellent, no issues identified    Primary progressive MS: Needs improvement. Patient has not been scheduled for BICAMS test yet. There are no apparent drug interactions with the patient's medications and memantine, should a trial be of this medication be initiated.      Tremor: Unable to fully assess. Will make another attempt to reach out to care facility and ensure she has increased primidone dose appropriately.      PLAN:                  1. Patient to meet with LAURA Soto for BICAMS and possible trial of memantine (appt scheduled as of 10/22/19).   2. [facility] pharmacist to reach out to care facility via email this time at [email or web address] (Caroline Lala). Will await response before further medication assessment.     I spent 15 minutes with this patient  today. I offer these suggestions for consideration by Dr. Sevilla. A copy of the visit note was provided to the patient's referring provider.     Will follow up on 12/4/19    The patient declined a summary of these recommendations as an after visit summary.    Alba Phillips, Pharm.D.  Medication Therapy Management Pharmacist  Phone: [phone]

## 2019-11-21 ENCOUNTER — MYC MEDICAL ADVICE (OUTPATIENT)
Dept: NEUROLOGY | Facility: CLINIC | Age: 69
End: 2019-11-21

## 2019-12-04 ENCOUNTER — TELEPHONE (OUTPATIENT)
Dept: PHARMACY | Facility: CLINIC | Age: 69
End: 2019-12-04

## 2019-12-04 NOTE — TELEPHONE ENCOUNTER
Called patient's sister, Marge, for MTM visit. Unfortunately patient was unable to see Ashley Gillespie as the appointment was cancelled, so we decided to cancel the MTM visit at this time and Marge will reschedule after the appointment with Ashley on 1/24/20.     Alba Phillips, Pharm.D.  Medication Therapy Management Pharmacist  Phone: [phone]

## 2019-12-23 ENCOUNTER — MYC MEDICAL ADVICE (OUTPATIENT)
Dept: NEUROLOGY | Facility: CLINIC | Age: 69
End: 2019-12-23

## 2019-12-23 DIAGNOSIS — N30.90 BLADDER INFECTION: Primary | ICD-10-CM

## 2019-12-23 NOTE — TELEPHONE ENCOUNTER
Notified daughter via Mychart of the rescheduled Care visit. Per last office visit notes, patient was supposed to hold off on starting the Nemenda until after the care visit. Care visit rescheduled for Feb 14th 2020 which is the next appointment time available. Can we address the questions if this patient can hold off this long without starting this medication? Please advise    Tali Garcia MA

## 2019-12-23 NOTE — TELEPHONE ENCOUNTER
Dr. Wilson, patient's CARE visit had to be rescheduled to February from January; The patient was waiting until after the CARE visit to start on the Namenda; Patient's daughter wondering if this is okay? And should she push out her appointment with you until after the CARE visit? Thank you.    Sandi Wells, MS RN Care Coordinator

## 2019-12-23 NOTE — TELEPHONE ENCOUNTER
Spoke with Dr Wilson by phone about this patient. Dr Wilson would like the patient to come in this week for a check-up to be seen by Ashley Gillespie. [employer] message sent to patient sister who is the proxy for patient. Will await reply from patient    Tali Garcia MA

## 2019-12-27 ENCOUNTER — OFFICE VISIT (OUTPATIENT)
Dept: NEUROLOGY | Facility: CLINIC | Age: 69
End: 2019-12-27
Attending: PSYCHIATRY & NEUROLOGY
Payer: MEDICARE

## 2019-12-27 VITALS — SYSTOLIC BLOOD PRESSURE: 159 MMHG | DIASTOLIC BLOOD PRESSURE: 86 MMHG | WEIGHT: 139.3 LBS | HEART RATE: 85 BPM

## 2019-12-27 DIAGNOSIS — R35.0 URINARY FREQUENCY: ICD-10-CM

## 2019-12-27 DIAGNOSIS — R25.1 TREMOR: Primary | ICD-10-CM

## 2019-12-27 DIAGNOSIS — R15.9 FULL INCONTINENCE OF FECES: ICD-10-CM

## 2019-12-27 LAB
ALBUMIN UR-MCNC: NEGATIVE MG/DL
APPEARANCE UR: CLEAR
BACTERIA #/AREA URNS HPF: ABNORMAL /HPF
BILIRUB UR QL STRIP: NEGATIVE
COLOR UR AUTO: YELLOW
GLUCOSE UR STRIP-MCNC: NEGATIVE MG/DL
HGB UR QL STRIP: NEGATIVE
KETONES UR STRIP-MCNC: NEGATIVE MG/DL
LEUKOCYTE ESTERASE UR QL STRIP: ABNORMAL
MUCOUS THREADS #/AREA URNS LPF: PRESENT /LPF
NITRATE UR QL: POSITIVE
PH UR STRIP: 5 PH (ref 5–7)
RBC #/AREA URNS AUTO: 1 /HPF (ref 0–2)
SOURCE: ABNORMAL
SP GR UR STRIP: 1.02 (ref 1–1.03)
SQUAMOUS #/AREA URNS AUTO: <1 /HPF (ref 0–1)
UROBILINOGEN UR STRIP-MCNC: 0 MG/DL (ref 0–2)
WBC #/AREA URNS AUTO: 16 /HPF (ref 0–5)

## 2019-12-27 PROCEDURE — 81001 URINALYSIS AUTO W/SCOPE: CPT | Performed by: NURSE PRACTITIONER

## 2019-12-27 PROCEDURE — 87186 SC STD MICRODIL/AGAR DIL: CPT | Performed by: NURSE PRACTITIONER

## 2019-12-27 PROCEDURE — 87086 URINE CULTURE/COLONY COUNT: CPT | Performed by: NURSE PRACTITIONER

## 2019-12-27 PROCEDURE — 87088 URINE BACTERIA CULTURE: CPT | Performed by: NURSE PRACTITIONER

## 2019-12-27 ASSESSMENT — PAIN SCALES - GENERAL: PAINLEVEL: NO PAIN (0)

## 2019-12-27 NOTE — LETTER
12/27/2019       RE: Karen Angel  [address]  [facility] [address]     Dear Colleague,    Thank you for referring your patient, Karen Angel, to the [facility] MULTIPLE S[facility]OS[facility] at [facility]. Please see a copy of my visit note below.    [facility] OUTPATIENT MULTIPLE SCLEROSIS CLINIC VISIT NOTE    REASON FOR VISIT: Isabel is a 69 year old right handed female who presents to the clinic today for regularly scheduled follow up of Secondary Progressive MS. She was most recently seen by Dr. Wilson on 10/10/2019. She presents to the evaluation with her sister, Helene.     HISTORY OF PRESENT ILLNESS: Please refer to Dr. Wilson's initial clinic notes for details.     INTERVAL HISTORY SINCE LAST VISIT: Most recent visit with Dr. Wilson on 10/10/2019.  At that time primidone was increased to 75 mg daily for worsening tremors.  Patient lives in assisted living facility in [place].  Per sister, no significant improvement in her bilateral arm tremors or head tremors since primidone increase.  She was notified by the assisted living facility that patient is having increased trouble with the feeding herself due to hand tremors.  During her last visit it was recommended to start Namenda after at baseline BICAMS.  This visit is scheduled with me in February.    Today her sister's main concern was recent worsening in patient's urinary symptoms and bowel incontinence.  She wears incontinence pad all times.  Patient denies any vision, speech, swallowing, hearing.  She is right-handed.  She denies any numbness, tingling or weakness in her upper and lower extremities.  Patient uses a walker wheeled for ambulation for the past 2 years or so.  No recent falls.  She rates her fatigue as mild. She participates in a day program 2 times a week and enjoys it.     In the past she was given the diagnosis of Parkinson's disease and was treated with the carbidopa levodopa.   "This was discontinued after she was seen by Naun Coates.      PAST MEDICAL/SURGICAL HISTORY:   1.  Multiple sclerosis  2.  Cholecystectomy  3.  Hysterectomy    FAMILY HISTORY: Negative for demyelinating diseases.    SOCIAL HISTORY: Patient is single, never  with no children.  She currently lives in an assisted living facility in [place].  She quit smoking in 2009.  Denies any alcohol or recreational drug use.    9/2019 MRI Brain:  IMPRESSION:  1. Redemonstrated nonspecific extensive confluent  nonenhancing/nonrestricting white matter signal abnormalities and  moderate global brain parenchymal volume loss. Given the patient's  history, this may be sequela of extensive demyelinating disease,  advanced small vessel ischemic disease, or related to other  nonspecific leukoencephalopathy. Clinical correlation is recommended.  2. No acute intracranial abnormality is identified.       Vit D: 2000 international unit(s) daily.     PHYSICAL EXAMINATION:  VITAL SIGNS: BP (!) 159/86 (BP Location: Left arm, Patient Position: Chair, Cuff Size: Adult Regular)   Pulse 85   Wt 63.2 kg (139 lb 4.8 oz)    MENTAL STATUS: Alert,awake. Tells me she is at a \"[facility] office, in [place], December or January and 2009\" to orientation questions.   CRANIAL NERVES: Visual fields are full to confrontation. The pupils are round and react to light and there is no Alexander Camilla pupil. Extraocular movements are intact with no internuclear ophthalmoplegia. No nystagmus. Facial strength and sensation are normal. Hearing is normal. Palate elevation and tongue protrusion are  normal.   POWER: Strength is as follows in the following muscles/groups (Right/Left,MRC scale): Shoulder abduction 5/5, elbow flexion 5/5, elbow extension 5/5, wrist extension 5/5, digit extension 5/5, digit flexion 5-/5-, Hip flexion 5/5, knee extension 5/5, knee flexion 5/5, foot dorsiflexion 5/5.   SENSORY: Intact to light touch.   MOTOR/CEREBELLAR: Tremors " noted in bilateral upper extremities and head. No myoclonus. Tone is within  normal limits in the limbs. Bilateral finger-to-nose testing and rapid alternating movements are clumsy, left worse than the right. There is  no pronator drift.   GAIT: Needs assistance to stand up from a sitting position. She ambulates with a wheeled walker.     ASSESSMENT/ PLAN:   1. Multiple Sclerosis: Patient was diagnosed with MS in 2009.  She was never treated with any of the disease modifying treatments in the past.  We will continue to do symptomatic management at this time.-Patient to follow-up with Dr. Wilson in 6 months.  She will have  [facility] evaluation in February as scheduled.     2.  Increased urinary symptoms: We will check a urinalysis today to rule out a urinary tract infection.    3.  Bowel incontinence: Discussed a GI referral and patient and her sister is interested.  Order placed.    4.  Bilateral hand tremors: Primidone was increased to 75 mg daily during her past visit with Dr. Wilson.  Per patient's sister, she was notified by the assisted living facility that patient is having increased trouble with feeding herself due to worsening tremors in her hands.  Discussed occupational therapy evaluation and an order was placed in the chart.    5. Please contact us with questions or concerns.     Ashley Gillespie, CNP  [facility] Neurology    I spent 50 minutes with this patient today, greater than 50% of which was spent in counseling and coordination of care.     (Chart documentation was completed in part with Dragon voice-recognition software. Even though reviewed, some grammatical, spelling, and word errors may remain.)

## 2019-12-27 NOTE — PATIENT INSTRUCTIONS
1. Urinalysis today to r/o an infection.     2. GI referral for bowel incontinence.     3. OT referral for worsening tremors.     4. F/u with Dr. Wilson in 6 months.

## 2019-12-27 NOTE — PROGRESS NOTES
[facility] OUTPATIENT MULTIPLE SCLEROSIS CLINIC VISIT NOTE      REASON FOR VISIT: Isabel is a 69 year old right handed female who presents to the clinic today for regularly scheduled follow up of Secondary Progressive MS. She was most recently seen by Dr. Wilson on 10/10/2019. She presents to the evaluation with her sister, Helene.     HISTORY OF PRESENT ILLNESS: Please refer to Dr. Wilson's initial clinic notes for details.     INTERVAL HISTORY SINCE LAST VISIT: Most recent visit with Dr. Wilson on 10/10/2019.  At that time primidone was increased to 75 mg daily for worsening tremors.  Patient lives in assisted living facility in [place].  Per sister, no significant improvement in her bilateral arm tremors or head tremors since primidone increase.  She was notified by the assisted living facility that patient is having increased trouble with the feeding herself due to hand tremors.  During her last visit it was recommended to start Namenda after at baseline BICAMS.  This visit is scheduled with me in February.    Today her sister's main concern was recent worsening in patient's urinary symptoms and bowel incontinence.  She wears incontinence pad all times.  Patient denies any vision, speech, swallowing, hearing.  She is right-handed.  She denies any numbness, tingling or weakness in her upper and lower extremities.  Patient uses a walker wheeled for ambulation for the past 2 years or so.  No recent falls.  She rates her fatigue as mild. She participates in a day program 2 times a week and enjoys it.     In the past she was given the diagnosis of Parkinson's disease and was treated with the carbidopa levodopa.  This was discontinued after she was seen by Naun Coates.      PAST MEDICAL/SURGICAL HISTORY:   1.  Multiple sclerosis  2.  Cholecystectomy  3.  Hysterectomy    FAMILY HISTORY: Negative for demyelinating diseases.    SOCIAL HISTORY: Patient is single, never  with no children.  She  "currently lives in an assisted living facility in [place].  She quit smoking in 2009.  Denies any alcohol or recreational drug use.    9/2019 MRI Brain:  IMPRESSION:  1. Redemonstrated nonspecific extensive confluent  nonenhancing/nonrestricting white matter signal abnormalities and  moderate global brain parenchymal volume loss. Given the patient's  history, this may be sequela of extensive demyelinating disease,  advanced small vessel ischemic disease, or related to other  nonspecific leukoencephalopathy. Clinical correlation is recommended.  2. No acute intracranial abnormality is identified.       Vit D: 2000 international unit(s) daily.     PHYSICAL EXAMINATION:  VITAL SIGNS: BP (!) 159/86 (BP Location: Left arm, Patient Position: Chair, Cuff Size: Adult Regular)   Pulse 85   Wt 63.2 kg (139 lb 4.8 oz)    MENTAL STATUS: Alert,awake. Tells me she is at a \"[facility] office, in [place], December or January and 2009\" to orientation questions.   CRANIAL NERVES: Visual fields are full to confrontation. The pupils are round and react to light and there is no Alexander Camilla pupil. Extraocular movements are intact with no internuclear ophthalmoplegia. No nystagmus. Facial strength and sensation are normal. Hearing is normal. Palate elevation and tongue protrusion are  normal.   POWER: Strength is as follows in the following muscles/groups (Right/Left,MRC scale): Shoulder abduction 5/5, elbow flexion 5/5, elbow extension 5/5, wrist extension 5/5, digit extension 5/5, digit flexion 5-/5-, Hip flexion 5/5, knee extension 5/5, knee flexion 5/5, foot dorsiflexion 5/5.   SENSORY: Intact to light touch.   MOTOR/CEREBELLAR: Tremors noted in bilateral upper extremities and head. No myoclonus. Tone is within  normal limits in the limbs. Bilateral finger-to-nose testing and rapid alternating movements are clumsy, left worse than the right. There is  no pronator drift.   GAIT: Needs assistance to stand up from a sitting " position. She ambulates with a wheeled walker.       ASSESSMENT/ PLAN:   1. Multiple Sclerosis: Patient was diagnosed with MS in 2009.  She was never treated with any of the disease modifying treatments in the past.  We will continue to do symptomatic management at this time.-Patient to follow-up with Dr. Wilson in 6 months.  She will have  [facility] evaluation in February as scheduled.     2.  Increased urinary symptoms: We will check a urinalysis today to rule out a urinary tract infection.    3.  Bowel incontinence: Discussed a GI referral and patient and her sister is interested.  Order placed.    4.  Bilateral hand tremors: Primidone was increased to 75 mg daily during her past visit with Dr. Wilson.  Per patient's sister, she was notified by the assisted living facility that patient is having increased trouble with feeding herself due to worsening tremors in her hands.  Discussed occupational therapy evaluation and an order was placed in the chart.    5. Please contact us with questions or concerns.     Ashley Gillespie CNP  [facility] Neurology          I spent 50 minutes with this patient today, greater than 50% of which was spent in counseling and coordination of care.     (Chart documentation was completed in part with Dragon voice-recognition software. Even though reviewed, some grammatical, spelling, and word errors may remain.)

## 2019-12-29 LAB
BACTERIA SPEC CULT: ABNORMAL
Lab: ABNORMAL
SPECIMEN SOURCE: ABNORMAL

## 2019-12-30 RX ORDER — NITROFURANTOIN MACROCRYSTAL 100 MG
100 CAPSULE ORAL 2 TIMES DAILY
Qty: 10 CAPSULE | Refills: 0 | Status: SHIPPED | OUTPATIENT
Start: 2019-12-30 | End: 2020-01-04

## 2019-12-30 NOTE — TELEPHONE ENCOUNTER
Rx sent to patient's pharmacy per Dr. Covarrubias; MyChart message sent to patient/daughter advising these results and instructions for the antibiotic.    Sandi Wells, MS RN Care Coordinator

## 2019-12-30 NOTE — TELEPHONE ENCOUNTER
Patient was seen by Ashley in clinic and completed a UA/UC; Dr. Wilson and Ashley are not in the clinic this week; Dr. Covarrubias, please review the patient's UA results and advise a recommendation; Thank you.    Sandi Wells, MS RN Care Coordinator

## 2020-01-02 NOTE — TELEPHONE ENCOUNTER
Patient's daughter has not read the [employer] message; I called the pharmacy, and they couldn't tell if the medication had been picked up yet; I called the patient's daughter, Marge, and let her know about the UA results and that there is an antibiotic at the pharmacy.    Sandi Wells, MS RN Care Coordinator

## 2020-01-06 NOTE — TELEPHONE ENCOUNTER
RECORDS RECEIVED FROM: [facility] Multiple Sclerosis    DATE RECEIVED: 1/28/2020   NOTES STATUS DETAILS   OFFICE NOTE from referring provider Internal 12/27/19 Office visit with LAURA Soto CNP    OFFICE NOTE from other specialist N/A    DISCHARGE SUMMARY from hospital N/A    OPERATIVE REPORT N/A    MEDICATION LIST Internal         ENDOSCOPY  N/A    COLONOSCOPY N/A    ERCP N/A    EUS N/A    STOOL TESTING N/A    PERTINENT LABS Care Everywhere    PATHOLOGY REPORTS (RELATED) N/A    IMAGING (CT, MRI, EGD) N/A      REFERRAL INFORMATION    Date referral was placed: 1/28/2020   Date all records received: N/A   Date records were scanned into Epic: N/A   Date records were sent to Provider to review: N/A   Date and recommendation received from provider:  LETTER SENT  SCHEDULE APPOINTMENT   Date patient was contacted to schedule: 1/6/2020

## 2020-01-16 ENCOUNTER — MEDICAL CORRESPONDENCE (OUTPATIENT)
Dept: HEALTH INFORMATION MANAGEMENT | Facility: CLINIC | Age: 70
End: 2020-01-16

## 2020-01-22 ASSESSMENT — ENCOUNTER SYMPTOMS
PARALYSIS: 0
TINGLING: 0
SPEECH CHANGE: 0
NUMBNESS: 0
TREMORS: 1
SEIZURES: 0
HEADACHES: 0
DIZZINESS: 1
LOSS OF CONSCIOUSNESS: 0
MEMORY LOSS: 1
DISTURBANCES IN COORDINATION: 1
WEAKNESS: 0

## 2020-01-27 ENCOUNTER — TELEPHONE (OUTPATIENT)
Dept: GASTROENTEROLOGY | Facility: CLINIC | Age: 70
End: 2020-01-27

## 2020-01-27 NOTE — TELEPHONE ENCOUNTER
Spoke to Tae) reminding of appointment scheduled on 1/28/20 at 6pm with [facility]. Patient to arrive 15 min early. To reschedule or cancel patient to call [phone].    RUTH Madrid

## 2020-01-28 ENCOUNTER — OFFICE VISIT (OUTPATIENT)
Dept: GASTROENTEROLOGY | Facility: CLINIC | Age: 70
End: 2020-01-28
Payer: MEDICAID

## 2020-01-28 ENCOUNTER — PRE VISIT (OUTPATIENT)
Dept: GASTROENTEROLOGY | Facility: CLINIC | Age: 70
End: 2020-01-28

## 2020-01-28 ENCOUNTER — TELEPHONE (OUTPATIENT)
Dept: GASTROENTEROLOGY | Facility: CLINIC | Age: 70
End: 2020-01-28

## 2020-01-28 VITALS
HEIGHT: 68 IN | DIASTOLIC BLOOD PRESSURE: 117 MMHG | TEMPERATURE: 97.8 F | BODY MASS INDEX: 21.55 KG/M2 | WEIGHT: 142.2 LBS | HEART RATE: 93 BPM | SYSTOLIC BLOOD PRESSURE: 177 MMHG | RESPIRATION RATE: 16 BRPM | OXYGEN SATURATION: 98 %

## 2020-01-28 DIAGNOSIS — R15.9 INCONTINENCE OF FECES, UNSPECIFIED FECAL INCONTINENCE TYPE: Primary | ICD-10-CM

## 2020-01-28 ASSESSMENT — MIFFLIN-ST. JEOR: SCORE: 1223.51

## 2020-01-28 ASSESSMENT — PAIN SCALES - GENERAL: PAINLEVEL: NO PAIN (0)

## 2020-01-28 NOTE — LETTER
1/28/2020       RE: Karen Angel  [address]  [place] [address]     Dear Colleague,    Thank you for referring your patient, Karen Angel, to the [facility] GASTROENTEROLOGY AND IBD CLINIC at [facility]. Please see a copy of my visit note below.    GI CLINIC VISIT    CC/REFERRING MD:  Ashley Gillespie  REASON FOR CONSULTATION: Fecal incontinence    ASSESSMENT/PLAN:  Karen Angel is a 69 year old woman with a past medical history of MS referred from Ashley Gillespie CNP for evaluation of fecal incontinence.     1.  Fecal incontinence  Symptoms of fecal incontinence likely caused by neurogenic bowel in the setting of MS.  Would recommend continuing to treat MS with neurology here.  Medication Namenda does carry risk of urinary incontinence, unclear if this may also contribute to fecal incontinence.  Discussed that symptoms are common in the setting of multiple sclerosis and that we start with stool bulking and tracking.  Would recommend starting Metamucil capsules and slowly increasing up to 4 capsules a day to improve stool consistency.  We will have the patient's care facility track bowel movements as outlined below to bring to follow-up visit.  Also discussed plan to sit on the toilet 20 to 30 minutes after eating meals, and before going to bed to prevent incontinence.  Discussed that future interventions could include pelvic floor evaluation (though unclear if biofeedback therapy would be effective with cognitive impairment), scheduled enemas.  Could also consider colectomy if symptoms worsen significantly or if he has comorbid conditions secondary to fecal incontinence such as urinary tract infections, skin irritation.  Can discuss next steps further after tracking bowel movements.    -- start fiber supplementation   -- start with 1 capsule a day for 7 days, 2 capsules for 7 days, 3 capsules for 7 days, and then 4 capsules ongoing   -- start  scheduled toileting. Sit on toilet 20-30 minutes after each meal, and before bedtime. Can sit for a couple of minutes and if no bowel movement can walk around  -- track bowel movements   -- Use [place] Stool Chart for consistency   -- time of day of bowel movement    -- track time of incontinence (fecal)    -- write down any other symptoms     RTC 6-8 weeks     Thank you for this consultation.  It was a pleasure to participate in the care of this patient; please contact us with any further questions.     Kortney Naranjo PA-C  Division of Gastroenterology, Hepatology & Nutrition  [facility]      HPI  Karen Angel is a 69 year old woman with a past medical history of MS referred from Ashley Gillespie CNP for evaluation of fecal incontinence.     Helene the patient's sister was here today and assisted with the patient's history.     Helene reports right before Christmas, she was picking up her sister from her care facility and was told that she had incontinence of feces.  Has also been told that she has had incontinence of urine, though has had recent urinary tract infection and is unsure if symptoms may be related to urinary tract infection or not.  Over Christmas, Karen was staying with Helene and had episode of incontinence of stool.  States this was [place] scale 4-6.  When picking up Karen today, was told she also had an accident today.  Unclear how often this is happening at the care facility.    Patient reports that she is having a bowel movement at least once a day.  Does think she is sitting on the toilet after meals but is unable to give specific details.    Patient's Sister Helene is concerned that if she continues to have fecal incontinence, may have to switch into nursing home from current care facility.    ROS:    Please see bottom of note    PROBLEM LIST  Patient Active Problem List    Diagnosis Date Noted     Abnormal brain MRI 09/30/2019     Priority: Medium     Narrative &  Impression     MRI BRAIN WITHOUT AND WITH CONTRAST  9/30/2019 11:17 AM      HISTORY:  Neuro deficit(s), subacute. Tremor. Multiple sclerosis (H).     TECHNIQUE:  Multiplanar, multisequence MRI of the brain without and  with 7 mL Gadavist.     COMPARISON: Outside MRI head 10/22/2018.     FINDINGS: There is no restricted diffusion to suggest acute infarct.  Confluent areas of T2/T2 FLAIR signal abnormality within the  periventricular and deep cerebral white matter, extending to involve  the subcortical white matter to a lesser degree, overall similar in  configuration to the most recent examination. No abnormal postcontrast  enhancement or restricted diffusion is present. There is no  intracranial hemorrhage. There is moderate global brain parenchymal  volume loss, without a definite lobar predilection. There is ex vacuo  prominence of the supratentorial ventricular system without findings  of hydrocephalus.     The orbits appear within normal limits. The expected macrovascular  flow voids are maintained. The skull base, mid face and calvarium are  otherwise unremarkable.                                                                      IMPRESSION:  1. Redemonstrated nonspecific extensive confluent  nonenhancing/nonrestricting white matter signal abnormalities and  moderate global brain parenchymal volume loss. Given the patient's  history, this may be sequela of extensive demyelinating disease,  advanced small vessel ischemic disease, or related to other  nonspecific leukoencephalopathy. Clinical correlation is recommended.  2. No acute intracranial abnormality is identified.     CASH JULIAN MD            Episode of recurrent major depressive disorder, unspecified depression episode severity (H) 07/14/2016     Priority: Medium     Multiple sclerosis (H) 07/14/2016     Priority: Medium     Other dementia, without behavioral disturbance 07/14/2016     Priority: Medium     Tremor 07/14/2016     Priority: Medium        PERTINENT PAST MEDICAL HISTORY:  Past Medical History:   Diagnosis Date     Abnormal brain MRI 9/30/2019    Narrative & Impression   MRI BRAIN WITHOUT AND WITH CONTRAST  9/30/2019 11:17 AM    HISTORY:  Neuro deficit(s), subacute. Tremor. Multiple sclerosis (H).   TECHNIQUE:  Multiplanar, multisequence MRI of the brain without and with 7 mL Gadavist.   COMPARISON: Outside MRI head 10/22/2018.   FINDINGS: There is no restricted diffusion to suggest acute infarct. Confluent areas of T2/T2 FLAIR signal abno     Dementia (H)      Migraine      Multiple sclerosis, primary progressive (H)        PREVIOUS SURGERIES:  Past Surgical History:   Procedure Laterality Date     CHOLECYSTECTOMY       HYSTERECTOMY         ALLERGIES:   No Known Allergies    PERTINENT MEDICATIONS:    Current Outpatient Medications:      citalopram (CELEXA) 20 MG tablet, Take 20 mg by mouth daily Take 1 tablet by mouth daily @@ 8am, Disp: , Rfl:      clonazePAM (KLONOPIN) 0.5 MG tablet, Take 1 tablet (0.5 mg) by mouth 2 times daily as needed for anxiety, Disp: 60 tablet, Rfl: 3     guaiFENesin (MUCINEX) 600 MG 12 hr tablet, Take 1 tablet twice a day as needed, Disp: , Rfl:      memantine (NAMENDA) 10 MG tablet, Take 1 tablet (10 mg) by mouth 2 times daily (Patient not taking: Reported on 12/27/2019), Disp: 60 tablet, Rfl: 3     primidone (MYSOLINE) 50 MG tablet, Start with 0.5 tab x 2 weeks, increase to 1 tab x 2 weeks, increase to 1.5 tab and continue on this dose, Disp: 180 tablet, Rfl: 3     traZODone (DESYREL) 100 MG tablet, Take 1 tablet by mouth with food or snack @@ 8pm, Disp: , Rfl:      vitamin D3 (CHOLECALCIFEROL) 2000 units (50 mcg) tablet, Take 1 tablet by mouth daily @@ 8am, Disp: , Rfl:     SOCIAL HISTORY:  Social History     Socioeconomic History     Marital status: Single     Spouse name: Not on file     Number of children: Not on file     Years of education: Not on file     Highest education level: Not on file   Occupational  History     Occupation: unemployed   Social Needs     Financial resource strain: Not on file     Food insecurity:     Worry: Not on file     Inability: Not on file     Transportation needs:     Medical: Not on file     Non-medical: Not on file   Tobacco Use     Smoking status: Former Smoker     Packs/day: 1.00     Years: 20.00     Pack years: 20.00     Smokeless tobacco: Never Used   Substance and Sexual Activity     Alcohol use: Not Currently     Drug use: Not on file     Sexual activity: Not on file   Lifestyle     Physical activity:     Days per week: Not on file     Minutes per session: Not on file     Stress: Not on file   Relationships     Social connections:     Talks on phone: Not on file     Gets together: Not on file     Attends [employer] service: Not on file     Active member of club or organization: Not on file     Attends meetings of clubs or organizations: Not on file     Relationship status: Not on file     Intimate partner violence:     Fear of current or ex partner: Not on file     Emotionally abused: Not on file     Physically abused: Not on file     Forced sexual activity: Not on file   Other Topics Concern     Not on file   Social History Narrative    Single. Lives in [place] at [facility] an assisted living facility sine 2014.     Prior to her hospitalization in 2008 she worked as a  that afterward at [employer].        She attended the [employer] for 2 years.       FAMILY HISTORY:  FH of CRC: mother had colorectal cancer- 70s   FH of IBD: none   Family History   Problem Relation Age of Onset     Myocardial Infarction Mother      Hypertension Mother      Heart Failure Father        Past/family/social history reviewed and no changes    PHYSICAL EXAMINATION:  Constitutional: aaox3, cooperative, pleasant, not dyspneic/diaphoretic, no acute distress  Vitals reviewed: BP (!) 177/117 (BP Location: Left arm, Patient Position: Sitting, Cuff Size: Adult Regular)   Pulse 93   " Temp 97.8  F (36.6  C) (Oral)   Resp 16   Ht 1.727 m (5' 8\")   Wt 64.5 kg (142 lb 3.2 oz)   SpO2 98%   BMI 21.62 kg/m     Wt:   Wt Readings from Last 2 Encounters:   01/28/20 64.5 kg (142 lb 3.2 oz)   12/27/19 63.2 kg (139 lb 4.8 oz)      Eyes: Sclera anicteric/injected  Ears/nose/mouth/throat: Normal oropharynx without ulcers or exudate, mucus membranes moist, hearing intact  Neck: supple, thyroid normal size  CV: No edema  Respiratory: Unlabored breathing  Lymph: No  submandibular, supraclavicular or lymphadenopathy  Abd:  Nondistended, no hepatosplenomegaly, nontender, no peritoneal signs  Skin: warm, perfused, no jaundice  Psych: Normal affect  MSK: patient has walker for ambulation   Neuro: patient has tremor   Patient deferred rectal exam today    * elevated blood pressure today- asymptomatic and re-checked by LPN today. Explained that she should follow with PCP or neuro regarding this.     PERTINENT STUDIES:    Orders Only on 12/27/2019   Component Date Value Ref Range Status     Color Urine 12/27/2019 Yellow   Final     Appearance Urine 12/27/2019 Clear   Final     Glucose Urine 12/27/2019 Negative  NEG^Negative mg/dL Final     Bilirubin Urine 12/27/2019 Negative  NEG^Negative Final     Ketones Urine 12/27/2019 Negative  NEG^Negative mg/dL Final     Specific Gravity Urine 12/27/2019 1.016  1.003 - 1.035 Final     Blood Urine 12/27/2019 Negative  NEG^Negative Final     pH Urine 12/27/2019 5.0  5.0 - 7.0 pH Final     Protein Albumin Urine 12/27/2019 Negative  NEG^Negative mg/dL Final     Urobilinogen mg/dL 12/27/2019 0.0  0.0 - 2.0 mg/dL Final     Nitrite Urine 12/27/2019 Positive* NEG^Negative Final     Leukocyte Esterase Urine 12/27/2019 Trace* NEG^Negative Final     Source 12/27/2019 Midstream Urine   Final     WBC Urine 12/27/2019 16* 0 - 5 /HPF Final     RBC Urine 12/27/2019 1  0 - 2 /HPF Final     Bacteria Urine 12/27/2019 Many* NEG^Negative /HPF Final     Squamous Epithelial /HPF Urine 12/27/2019 " <1  0 - 1 /HPF Final     Mucous Urine 12/27/2019 Present* NEG^Negative /LPF Final     Specimen Description 12/27/2019 Midstream Urine   Final     Special Requests 12/27/2019 Specimen received in preservative   Final     Culture Micro 12/27/2019 *  Final                    Value:>100,000 colonies/mL  Escherichia coli         Again, thank you for allowing me to participate in the care of your patient.      Sincerely,    Kortney Naranjo PA-C

## 2020-01-29 NOTE — PATIENT INSTRUCTIONS
It was a pleasure taking care of you today.  I've included a brief summary of our discussion and care plan from today's visit below.  Please review this information with your primary care provider.  ______________________________________________________________________    My recommendations are summarized as follows:    -- start fiber supplementation     -- start with 1 capsule a day for 7 days, 2 capsules for 7 days, 3 capsules for 7 days, and then 4 capsules ongoing     -- start scheduled toileting. Sit on toilet 20-30 minutes after each meal, and before bedtime. Can sit for a couple of minutes and if no bowel movement can walk around    -- track bowel movements   -- Use [place] Stool Chart for consistency   -- time of day of bowel movement    -- track time of incontinence (fecal)    -- write down any other symptoms     Return to GI Clinic in 6-8 weeks to review your progress.    ______________________________________________________________________    Who do I call with any questions after my visit?  Please be in touch if there are any further questions that arise following today's visit.  There are multiple ways to contact your gastroenterology care team.        During business hours, you may reach a Gastroenterology nurse at [phone]      To schedule or reschedule an appointment, please call [phone].       You can always send a secure message through [employer].  [employer] messages are answered by your nurse or doctor typically within 24 hours.  Please allow extra time on weekends and holidays.        For urgent/emergent questions after business hours, you may reach the on-call GI Fellow by contacting the [facility] at [phone].     How will I get the results of any tests ordered?    You will receive all of your results.  If you have signed up for [employer], any tests ordered at your visit will be available to you after your physician reviews them.  Typically this takes 1-2 weeks.  If  there are urgent results that require a change in your care plan, your physician or nurse will call you to discuss the next steps.      What is [employer]?  [employer] is a secure way for you to access all of your healthcare records from the [facility].  It is a web based computer program, so you can sign on to it from any location.  It also allows you to send secure messages to your care team.  I recommend signing up for [employer] access if you have not already done so and are comfortable with using a computer.      How to I schedule a follow-up visit?  If you did not schedule a follow-up visit today, please call [phone] to schedule a follow-up office visit.        Sincerely,    Kortney Naranjo PA-C  Division of Gastroenterology, Hepatology & Nutrition  [facility]

## 2020-01-29 NOTE — TELEPHONE ENCOUNTER
Called out to patient's residence where she currently resides at [facility], spoke with Padma-nursing assistant to advise of patient's blood pressure readings for today's visit being elevated. Was advised would be related to nurse next day as is only at facility 8am-5pm, and would update patient's PCP.

## 2020-01-29 NOTE — PROGRESS NOTES
GI CLINIC VISIT    CC/REFERRING MD:  Ashley Gillespie  REASON FOR CONSULTATION: Fecal incontinence    ASSESSMENT/PLAN:  Karen Angel is a 69 year old woman with a past medical history of MS referred from Ashley Gillespie CNP for evaluation of fecal incontinence.     1.  Fecal incontinence  Symptoms of fecal incontinence likely caused by neurogenic bowel in the setting of MS.  Would recommend continuing to treat MS with neurology here.  Medication Namenda does carry risk of urinary incontinence, unclear if this may also contribute to fecal incontinence.  Discussed that symptoms are common in the setting of multiple sclerosis and that we start with stool bulking and tracking.  Would recommend starting Metamucil capsules and slowly increasing up to 4 capsules a day to improve stool consistency.  We will have the patient's care facility track bowel movements as outlined below to bring to follow-up visit.  Also discussed plan to sit on the toilet 20 to 30 minutes after eating meals, and before going to bed to prevent incontinence.  Discussed that future interventions could include pelvic floor evaluation (though unclear if biofeedback therapy would be effective with cognitive impairment), scheduled enemas.  Could also consider colectomy if symptoms worsen significantly or if he has comorbid conditions secondary to fecal incontinence such as urinary tract infections, skin irritation.  Can discuss next steps further after tracking bowel movements.    -- start fiber supplementation   -- start with 1 capsule a day for 7 days, 2 capsules for 7 days, 3 capsules for 7 days, and then 4 capsules ongoing   -- start scheduled toileting. Sit on toilet 20-30 minutes after each meal, and before bedtime. Can sit for a couple of minutes and if no bowel movement can walk around  -- track bowel movements   -- Use [facility] Stool Chart for consistency   -- time of day of bowel movement    -- track time of incontinence (fecal)    --  write down any other symptoms       RTC 6-8 weeks     Thank you for this consultation.  It was a pleasure to participate in the care of this patient; please contact us with any further questions.       Kortney Naranjo PA-C  Division of Gastroenterology, Hepatology & Nutrition  [facility]        HPI  Karen Angel is a 69 year old woman with a past medical history of MS referred from Ashley Gillespie CNP for evaluation of fecal incontinence.     Helene the patient's sister was here today and assisted with the patient's history.     Helene reports right before Christmas, she was picking up her sister from her care facility and was told that she had incontinence of feces.  Has also been told that she has had incontinence of urine, though has had recent urinary tract infection and is unsure if symptoms may be related to urinary tract infection or not.  Over Christmas, Karen was staying with Helene and had episode of incontinence of stool.  States this was Kingsley scale 4-6.  When picking up Karen today, was told she also had an accident today.  Unclear how often this is happening at the care facility.    Patient reports that she is having a bowel movement at least once a day.  Does think she is sitting on the toilet after meals but is unable to give specific details.    Patient's Sister Helene is concerned that if she continues to have fecal incontinence, may have to switch into nursing home from current care facility.    ROS:    Please see bottom of note    PROBLEM LIST  Patient Active Problem List    Diagnosis Date Noted     Abnormal brain MRI 09/30/2019     Priority: Medium     Narrative & Impression     MRI BRAIN WITHOUT AND WITH CONTRAST  9/30/2019 11:17 AM      HISTORY:  Neuro deficit(s), subacute. Tremor. Multiple sclerosis (H).     TECHNIQUE:  Multiplanar, multisequence MRI of the brain without and  with 7 mL Gadavist.     COMPARISON: Outside MRI head 10/22/2018.     FINDINGS: There is no  restricted diffusion to suggest acute infarct.  Confluent areas of T2/T2 FLAIR signal abnormality within the  periventricular and deep cerebral white matter, extending to involve  the subcortical white matter to a lesser degree, overall similar in  configuration to the most recent examination. No abnormal postcontrast  enhancement or restricted diffusion is present. There is no  intracranial hemorrhage. There is moderate global brain parenchymal  volume loss, without a definite lobar predilection. There is ex vacuo  prominence of the supratentorial ventricular system without findings  of hydrocephalus.     The orbits appear within normal limits. The expected macrovascular  flow voids are maintained. The skull base, mid face and calvarium are  otherwise unremarkable.                                                                      IMPRESSION:  1. Redemonstrated nonspecific extensive confluent  nonenhancing/nonrestricting white matter signal abnormalities and  moderate global brain parenchymal volume loss. Given the patient's  history, this may be sequela of extensive demyelinating disease,  advanced small vessel ischemic disease, or related to other  nonspecific leukoencephalopathy. Clinical correlation is recommended.  2. No acute intracranial abnormality is identified.     CASH JULIAN MD            Episode of recurrent major depressive disorder, unspecified depression episode severity (H) 07/14/2016     Priority: Medium     Multiple sclerosis (H) 07/14/2016     Priority: Medium     Other dementia, without behavioral disturbance 07/14/2016     Priority: Medium     Tremor 07/14/2016     Priority: Medium       PERTINENT PAST MEDICAL HISTORY:  Past Medical History:   Diagnosis Date     Abnormal brain MRI 9/30/2019    Narrative & Impression   MRI BRAIN WITHOUT AND WITH CONTRAST  9/30/2019 11:17 AM    HISTORY:  Neuro deficit(s), subacute. Tremor. Multiple sclerosis (H).   TECHNIQUE:  Multiplanar, multisequence MRI  of the brain without and with 7 mL Gadavist.   COMPARISON: Outside MRI head 10/22/2018.   FINDINGS: There is no restricted diffusion to suggest acute infarct. Confluent areas of T2/T2 FLAIR signal abno     Dementia (H)      Migraine      Multiple sclerosis, primary progressive (H)        PREVIOUS SURGERIES:  Past Surgical History:   Procedure Laterality Date     CHOLECYSTECTOMY       HYSTERECTOMY         ALLERGIES:   No Known Allergies    PERTINENT MEDICATIONS:    Current Outpatient Medications:      citalopram (CELEXA) 20 MG tablet, Take 20 mg by mouth daily Take 1 tablet by mouth daily @@ 8am, Disp: , Rfl:      clonazePAM (KLONOPIN) 0.5 MG tablet, Take 1 tablet (0.5 mg) by mouth 2 times daily as needed for anxiety, Disp: 60 tablet, Rfl: 3     guaiFENesin (MUCINEX) 600 MG 12 hr tablet, Take 1 tablet twice a day as needed, Disp: , Rfl:      memantine (NAMENDA) 10 MG tablet, Take 1 tablet (10 mg) by mouth 2 times daily (Patient not taking: Reported on 12/27/2019), Disp: 60 tablet, Rfl: 3     primidone (MYSOLINE) 50 MG tablet, Start with 0.5 tab x 2 weeks, increase to 1 tab x 2 weeks, increase to 1.5 tab and continue on this dose, Disp: 180 tablet, Rfl: 3     traZODone (DESYREL) 100 MG tablet, Take 1 tablet by mouth with food or snack @@ 8pm, Disp: , Rfl:      vitamin D3 (CHOLECALCIFEROL) 2000 units (50 mcg) tablet, Take 1 tablet by mouth daily @@ 8am, Disp: , Rfl:     SOCIAL HISTORY:  Social History     Socioeconomic History     Marital status: Single     Spouse name: Not on file     Number of children: Not on file     Years of education: Not on file     Highest education level: Not on file   Occupational History     Occupation: unemployed   Social Needs     Financial resource strain: Not on file     Food insecurity:     Worry: Not on file     Inability: Not on file     Transportation needs:     Medical: Not on file     Non-medical: Not on file   Tobacco Use     Smoking status: Former Smoker     Packs/day: 1.00      "Years: 20.00     Pack years: 20.00     Smokeless tobacco: Never Used   Substance and Sexual Activity     Alcohol use: Not Currently     Drug use: Not on file     Sexual activity: Not on file   Lifestyle     Physical activity:     Days per week: Not on file     Minutes per session: Not on file     Stress: Not on file   Relationships     Social connections:     Talks on phone: Not on file     Gets together: Not on file     Attends Christianity service: Not on file     Active member of club or organization: Not on file     Attends meetings of clubs or organizations: Not on file     Relationship status: Not on file     Intimate partner violence:     Fear of current or ex partner: Not on file     Emotionally abused: Not on file     Physically abused: Not on file     Forced sexual activity: Not on file   Other Topics Concern     Not on file   Social History Narrative    Single. Lives in [place] at [facility] an assisted living facility sine 2014.     Prior to her hospitalization in 2008 she worked as a  that afterward at [employer].        She attended the [employer] for 2 years.       FAMILY HISTORY:  FH of CRC: mother had colorectal cancer- 70s   FH of IBD: none   Family History   Problem Relation Age of Onset     Myocardial Infarction Mother      Hypertension Mother      Heart Failure Father        Past/family/social history reviewed and no changes    PHYSICAL EXAMINATION:  Constitutional: aaox3, cooperative, pleasant, not dyspneic/diaphoretic, no acute distress  Vitals reviewed: BP (!) 177/117 (BP Location: Left arm, Patient Position: Sitting, Cuff Size: Adult Regular)   Pulse 93   Temp 97.8  F (36.6  C) (Oral)   Resp 16   Ht 1.727 m (5' 8\")   Wt 64.5 kg (142 lb 3.2 oz)   SpO2 98%   BMI 21.62 kg/m    Wt:   Wt Readings from Last 2 Encounters:   01/28/20 64.5 kg (142 lb 3.2 oz)   12/27/19 63.2 kg (139 lb 4.8 oz)      Eyes: Sclera anicteric/injected  Ears/nose/mouth/throat: Normal " oropharynx without ulcers or exudate, mucus membranes moist, hearing intact  Neck: supple, thyroid normal size  CV: No edema  Respiratory: Unlabored breathing  Lymph: No  submandibular, supraclavicular or lymphadenopathy  Abd:  Nondistended, no hepatosplenomegaly, nontender, no peritoneal signs  Skin: warm, perfused, no jaundice  Psych: Normal affect  MSK: patient has walker for ambulation   Neuro: patient has tremor   Patient deferred rectal exam today    * elevated blood pressure today- asymptomatic and re-checked by LPN today. Explained that she should follow with PCP or neuro regarding this.     PERTINENT STUDIES:    Orders Only on 12/27/2019   Component Date Value Ref Range Status     Color Urine 12/27/2019 Yellow   Final     Appearance Urine 12/27/2019 Clear   Final     Glucose Urine 12/27/2019 Negative  NEG^Negative mg/dL Final     Bilirubin Urine 12/27/2019 Negative  NEG^Negative Final     Ketones Urine 12/27/2019 Negative  NEG^Negative mg/dL Final     Specific Gravity Urine 12/27/2019 1.016  1.003 - 1.035 Final     Blood Urine 12/27/2019 Negative  NEG^Negative Final     pH Urine 12/27/2019 5.0  5.0 - 7.0 pH Final     Protein Albumin Urine 12/27/2019 Negative  NEG^Negative mg/dL Final     Urobilinogen mg/dL 12/27/2019 0.0  0.0 - 2.0 mg/dL Final     Nitrite Urine 12/27/2019 Positive* NEG^Negative Final     Leukocyte Esterase Urine 12/27/2019 Trace* NEG^Negative Final     Source 12/27/2019 Midstream Urine   Final     WBC Urine 12/27/2019 16* 0 - 5 /HPF Final     RBC Urine 12/27/2019 1  0 - 2 /HPF Final     Bacteria Urine 12/27/2019 Many* NEG^Negative /HPF Final     Squamous Epithelial /HPF Urine 12/27/2019 <1  0 - 1 /HPF Final     Mucous Urine 12/27/2019 Present* NEG^Negative /LPF Final     Specimen Description 12/27/2019 Midstream Urine   Final     Special Requests 12/27/2019 Specimen received in preservative   Final     Culture Micro 12/27/2019 *  Final                    Value:>100,000  colonies/mL  Escherichia coli           Answers for HPI/ROS submitted by the patient on 1/22/2020   General Symptoms: No  Skin Symptoms: No  HENT Symptoms: No  EYE SYMPTOMS: No  HEART SYMPTOMS: No  LUNG SYMPTOMS: No  INTESTINAL SYMPTOMS: No  URINARY SYMPTOMS: No  GYNECOLOGIC SYMPTOMS: No  BREAST SYMPTOMS: No  SKELETAL SYMPTOMS: No  BLOOD SYMPTOMS: No  NERVOUS SYSTEM SYMPTOMS: Yes  MENTAL HEALTH SYMPTOMS: No  Trouble with coordination: Yes  Dizziness or trouble with balance: Yes  Fainting or black-out spells: No  Memory loss: Yes  Headache: No  Seizures: No  Speech problems: No  Tingling: No  Tremor: Yes  Weakness: No  Difficulty walking: Yes  Paralysis: No  Numbness: No

## 2020-03-11 ENCOUNTER — HEALTH MAINTENANCE LETTER (OUTPATIENT)
Age: 70
End: 2020-03-11

## 2020-04-15 DIAGNOSIS — R15.9 INCONTINENCE OF FECES, UNSPECIFIED FECAL INCONTINENCE TYPE: ICD-10-CM

## 2020-04-17 NOTE — TELEPHONE ENCOUNTER
psyllium (METAMUCIL) 0.52 g capsule      Last Written Prescription Date:1/28/20  Last Fill Quantity:282,   # refills: 0  Last Office Visit : 1/28/20  Future Office visit:  None    Routing refill request to provider for review/approval because:  SIG is taper, ongoing dose?, Take 1 capsule by mouth daily for 7 days, THEN 2 capsules daily for 7 days, THEN 3 capsules daily for 7 days, THEN 4 capsules daily

## 2020-04-22 ENCOUNTER — TELEPHONE (OUTPATIENT)
Dept: GASTROENTEROLOGY | Facility: CLINIC | Age: 70
End: 2020-04-22

## 2020-04-22 NOTE — TELEPHONE ENCOUNTER
[facility] Call Center    Phone Message    May a detailed message be left on voicemail: no     Reason for Call: Medication Question or concern regarding medication   Prescription Clarification  Name of Medication: psyllium (METAMUCIL) 0.52 g capsule   Prescribing Provider: Jose A   Pharmacy: [facility]   What on the order needs clarification? They are neding to know if the provider is still handling this. Please reach out pt needing medication           Action Taken: Message routed to:  [facility] Ray[place]    Travel Screening: Not Applicable

## 2020-05-28 ENCOUNTER — RECORDS - HEALTHEAST (OUTPATIENT)
Dept: LAB | Facility: CLINIC | Age: 70
End: 2020-05-28

## 2020-05-28 LAB
ANION GAP SERPL CALCULATED.3IONS-SCNC: 7 MMOL/L (ref 5–18)
BUN SERPL-MCNC: 27 MG/DL (ref 8–22)
CALCIUM SERPL-MCNC: 9.4 MG/DL (ref 8.5–10.5)
CHLORIDE BLD-SCNC: 104 MMOL/L (ref 98–107)
CO2 SERPL-SCNC: 30 MMOL/L (ref 22–31)
CREAT SERPL-MCNC: 0.74 MG/DL (ref 0.6–1.1)
ERYTHROCYTE [DISTWIDTH] IN BLOOD BY AUTOMATED COUNT: 12.2 % (ref 11–14.5)
GFR SERPL CREATININE-BSD FRML MDRD: >60 ML/MIN/1.73M2
GLUCOSE BLD-MCNC: 109 MG/DL (ref 70–125)
HCT VFR BLD AUTO: 45.2 % (ref 35–47)
HGB BLD-MCNC: 13.9 G/DL (ref 12–16)
MCH RBC QN AUTO: 31.2 PG (ref 27–34)
MCHC RBC AUTO-ENTMCNC: 30.8 G/DL (ref 32–36)
MCV RBC AUTO: 101 FL (ref 80–100)
PLATELET # BLD AUTO: 203 THOU/UL (ref 140–440)
PMV BLD AUTO: 12.6 FL (ref 8.5–12.5)
POTASSIUM BLD-SCNC: 3.6 MMOL/L (ref 3.5–5)
RBC # BLD AUTO: 4.46 MILL/UL (ref 3.8–5.4)
SODIUM SERPL-SCNC: 141 MMOL/L (ref 136–145)
TSH SERPL DL<=0.005 MIU/L-ACNC: 0.58 UIU/ML (ref 0.3–5)
WBC: 8 THOU/UL (ref 4–11)

## 2020-07-28 ENCOUNTER — TELEPHONE (OUTPATIENT)
Dept: NEUROLOGY | Facility: CLINIC | Age: 70
End: 2020-07-28

## 2020-07-28 DIAGNOSIS — G35 MULTIPLE SCLEROSIS (H): ICD-10-CM

## 2020-07-28 NOTE — TELEPHONE ENCOUNTER
[facility] Call Center    Phone Message    May a detailed message be left on voicemail: yes     Reason for Call: Medication Refill Request    Has the patient contacted the pharmacy for the refill? Yes   Name of medication being requested: clonazePAM (KLONOPIN) 0.5 MG tablet   Provider who prescribed the medication: Dr. Wilson   Pharmacy: [facility]   Date medication is needed: as soon as possible          Action Taken: Message routed to:  [facility]  neuro    Travel Screening: Not Applicable

## 2020-07-28 NOTE — TELEPHONE ENCOUNTER
Rx pended to Dr. Wilson for signature; Patient does not have a follow up appointment; I will have our medical assistant, aTli, call the patient to get scheduled.    Sandi Wells, MS RN Care Coordinator

## 2020-07-30 RX ORDER — CLONAZEPAM 0.5 MG/1
0.5 TABLET ORAL 2 TIMES DAILY PRN
Qty: 60 TABLET | Refills: 3 | Status: SHIPPED | OUTPATIENT
Start: 2020-07-30 | End: 2021-06-15

## 2020-08-03 NOTE — TELEPHONE ENCOUNTER
urszula at [facility] is calling to confirm pts dose and when garrett should take her medication, she has been taking it at 8am and 4pm, is that ok ? Please call back at [phone] thanks

## 2020-08-04 NOTE — TELEPHONE ENCOUNTER
I tried calling the number provided in the message, which got me to [facility] in [place]; The person I spoke with gave me the main number, and when I was transferred to [facility], there was no answer.    Sandi Wells, MS RN Care Coordinator

## 2020-08-10 ENCOUNTER — TELEPHONE (OUTPATIENT)
Dept: NEUROLOGY | Facility: CLINIC | Age: 70
End: 2020-08-10

## 2020-08-10 NOTE — TELEPHONE ENCOUNTER
[facility] Call Center    Phone Message    May a detailed message be left on voicemail: yes     Reason for Call: Other: Yoli calling from [facility] [place] to ask for clarification on Karen's clonazePAM (KLONOPIN). Yoli would like to know if the care team has discontinued the scheduled dose of clonazePAM (KLONOPIN) and are now only using the PRN dose. Yoli also wanted to clarify if the scheduled dose of clonazePAM (KLONOPIN) was .25 mg and that the PRN dose is .5 mg. Please give Yoli a call back at your earliest convenience to discuss.     Action Taken: Message routed to:  [facility]  Neuro    Travel Screening: Not Applicable

## 2020-08-10 NOTE — TELEPHONE ENCOUNTER
Dr. Wilson ordered clonazepam 0.5 mg BID PRN.   Called patient's care facility and made them aware of this. No further needs at this time.

## 2020-09-03 ENCOUNTER — VIRTUAL VISIT (OUTPATIENT)
Dept: NEUROLOGY | Facility: CLINIC | Age: 70
End: 2020-09-03
Attending: PSYCHIATRY & NEUROLOGY
Payer: MEDICARE

## 2020-09-03 DIAGNOSIS — G35 COGNITIVE IMPAIRMENT DUE TO MULTIPLE SCLEROSIS (H): ICD-10-CM

## 2020-09-03 DIAGNOSIS — G35 MS (MULTIPLE SCLEROSIS) (H): Primary | ICD-10-CM

## 2020-09-03 DIAGNOSIS — R41.89 COGNITIVE IMPAIRMENT DUE TO MULTIPLE SCLEROSIS (H): ICD-10-CM

## 2020-09-03 NOTE — PROGRESS NOTES
"Karen Angel is a 70 year old female who is being evaluated via a billable telephone visit.      The patient has been notified of following:     \"This telephone visit will be conducted via a call between you and your physician/provider. We have found that certain health care needs can be provided without the need for a physical exam.  This service lets us provide the care you need with a short phone conversation.  If a prescription is necessary we can send it directly to your pharmacy.  If lab work is needed we can place an order for that and you can then stop by our lab to have the test done at a later time.    Telephone visits are billed at different rates depending on your insurance coverage. During this emergency period, for some insurers they may be billed the same as an in-person visit.  Please reach out to your insurance provider with any questions.    If during the course of the call the physician/provider feels a telephone visit is not appropriate, you will not be charged for this service.\"    Patient has given verbal consent for Telephone visit?  Yes    What phone number would you like to be contacted at? [phone]    How would you like to obtain your AVS? [facility]    Phone call duration: 10 minutes    This is a telephone follow-up visit for this 70-year-old female with a history of multiple sclerosis with significant cognitive impairment, cerebellar outflow tremor and ataxia.  She is currently at a nursing home and I spoke with her nurse Yoli as well as the patient herself.  The patient was originally seen in October 2019, at that time she was recommended to increase the primidone to 3 tablets a day to improve the tremors, and she was referred for a care visit with Shy Gillespie in order to evaluate her cognitive impairment.    The patient reports that her tremors have improved but the nurse states they seem to be about the same although it is hard to know whether they changed from my evaluation in " October since she is now in a new nursing home.  She was unable to delay care visit due to the pandemic but she feels well denies new neurological symptoms or any discomfort and is in good spirits, has not gotten sick from COVID despite being in a nursing home.    At this point she will be referred again for a care visit which recently resumed and once we have evaluated her cognitive impairment she will be started on Namenda.    She will also be referred to Dr. Pedro Gómez for complete neuropsychological evaluation and for recommendations on management.    Yoli irvin in our facility and the patient understand and agrees with the plan.      Irais Wilson MD

## 2020-09-03 NOTE — LETTER
"9/3/2020     RE: Karen Angel  [address]  [place] [address]     Dear Colleague,    Thank you for referring your patient, Karen Angel, to the [facility] MULTIPLE SCLEROSIS at [facility]. Please see a copy of my visit note below.    Karen Angel is a 70 year old female who is being evaluated via a billable telephone visit.      The patient has been notified of following:     \"This telephone visit will be conducted via a call between you and your physician/provider. We have found that certain health care needs can be provided without the need for a physical exam.  This service lets us provide the care you need with a short phone conversation.  If a prescription is necessary we can send it directly to your pharmacy.  If lab work is needed we can place an order for that and you can then stop by our lab to have the test done at a later time.    Telephone visits are billed at different rates depending on your insurance coverage. During this emergency period, for some insurers they may be billed the same as an in-person visit.  Please reach out to your insurance provider with any questions.    If during the course of the call the physician/provider feels a telephone visit is not appropriate, you will not be charged for this service.\"    Patient has given verbal consent for Telephone visit?  Yes    What phone number would you like to be contacted at? [phone]    How would you like to obtain your AVS? Kalyani    Phone call duration: 10 minutes    This is a telephone follow-up visit for this 70-year-old female with a history of multiple sclerosis with significant cognitive impairment, cerebellar outflow tremor and ataxia.  She is currently at a nursing home and I spoke with her nurse Yoli as well as the patient herself.  The patient was originally seen in October 2019, at that time she was recommended to increase the primidone to 3 tablets a day to improve " the tremors, and she was referred for a care visit with Shy Gillespie in order to evaluate her cognitive impairment.    The patient reports that her tremors have improved but the nurse states they seem to be about the same although it is hard to know whether they changed from my evaluation in October since she is now in a new nursing home.  She was unable to delay care visit due to the pandemic but she feels well denies new neurological symptoms or any discomfort and is in good spirits, has not gotten sick from COVID despite being in a nursing home.    At this point she will be referred again for a care visit which recently resumed and once we have evaluated her cognitive impairment she will be started on Namenda.    She will also be referred to Dr. Pedro Gómez for complete neuropsychological evaluation and for recommendations on management.    Yoli irvin in our facility and the patient understand and agrees with the plan.      Irais Wlison MD

## 2020-09-22 ENCOUNTER — TELEPHONE (OUTPATIENT)
Dept: NEUROLOGY | Facility: CLINIC | Age: 70
End: 2020-09-22

## 2020-09-22 DIAGNOSIS — R25.1 TREMOR: ICD-10-CM

## 2020-09-22 RX ORDER — PRIMIDONE 50 MG/1
TABLET ORAL
Qty: 45 TABLET | Refills: 2 | OUTPATIENT
Start: 2020-09-22

## 2020-09-22 NOTE — TELEPHONE ENCOUNTER
Received prescription request for primidone.    Called sister Marge to find out what dose the patient is taking of the primidone and let her know she must set up an appointment with Dr. Sevilla to continue to receive prescriptions from him.    The sister's mailbox was full and cannot receive messages. I will deny this request and let the pharmacy know why. They can then, hopefully, let the patient/family know to call our clinic.

## 2020-09-22 NOTE — TELEPHONE ENCOUNTER
Rx Authorization:    Requested Medication/ Dose: Primidone 50    Date last refill ordered: 9/11/19    Quantity ordered: 180    # refills: 3    Date of last clinic visit with ordering provider: 9/11/19    Date of next clinic visit with ordering provider: 0    All pertinent protocol data (lab date/result):     Include pertinent information from patients message:

## 2021-01-27 ENCOUNTER — VIRTUAL VISIT (OUTPATIENT)
Dept: NEUROPSYCHOLOGY | Facility: CLINIC | Age: 71
End: 2021-01-27
Payer: COMMERCIAL

## 2021-01-27 DIAGNOSIS — G35: Primary | ICD-10-CM

## 2021-01-27 DIAGNOSIS — F02.80: Primary | ICD-10-CM

## 2021-01-27 PROCEDURE — 96139 PSYCL/NRPSYC TST TECH EA: CPT | Mod: 95 | Performed by: CLINICAL NEUROPSYCHOLOGIST

## 2021-01-27 PROCEDURE — 96133 NRPSYC TST EVAL PHYS/QHP EA: CPT | Mod: 95 | Performed by: CLINICAL NEUROPSYCHOLOGIST

## 2021-01-27 PROCEDURE — 96116 NUBHVL XM PHYS/QHP 1ST HR: CPT | Mod: 95 | Performed by: CLINICAL NEUROPSYCHOLOGIST

## 2021-01-27 PROCEDURE — 96132 NRPSYC TST EVAL PHYS/QHP 1ST: CPT | Mod: 95 | Performed by: CLINICAL NEUROPSYCHOLOGIST

## 2021-01-27 PROCEDURE — 96138 PSYCL/NRPSYC TECH 1ST: CPT | Mod: 95 | Performed by: CLINICAL NEUROPSYCHOLOGIST

## 2021-01-27 NOTE — NURSING NOTE
Pt was seen for neuropsychological evaluation at the request of Dr. Irais Wilson for the purposes of diagnostic clarification and treatment planning. 188 minutes of video test administration and scoring were provided by this writer. Please see Dr. Pedro Gómez's report for a full interpretation of the findings.    Video Start: 8:46  Video Stop: 10:15    Josh Villarreal  Psychometrist

## 2021-02-08 NOTE — PROGRESS NOTES
Karen Angel is a 70-year-old woman who is being evaluated via a billable video visit. Telemedicine services are necessary because of the COVID-19 pandemic.     Patient would like the video invitation sent by: Send to e-mail at: [email or web address]  Will anyone else be joining your video visit? No     Visit Details  Type of service: Video Conference  Interview:     Start Time: 8:16 AM     End Time: 8:46 AM  Formal Testing:     Start Time: 8:46 AM     End Time: 10:15 AM  Originating Location (pt. Location): Home   Distant Location (provider location):  [facility]   Platform used for Video Visit: [facility]      NAME: Karen Angel  MRN: [id number]  : 1950  ALMODOVAR: 2021  Neuropsychology Laboratory  [address] [address]  [phone]    NEUROPSYCHOLOGICAL EVALUATION    RELEVANT HISTORY AND REASON FOR REFERRAL    This is a report of neuropsychological consultation regarding Karen Angel, a 70-year-old, right-handed woman with 14 years of formal education. She has diagnoses of relapsing-remitting multiple sclerosis (MS), cerebellar outflow tremor (hand and head), and ataxia. There had apparently been some prior consideration of Parkinson s disease, but that diagnosis was not supported. Her MS symptoms reportedly began around , and the diagnosis was made around . She follows with Dr. Irais Wilson for MS care. She demonstrates cognitive impairment and is prescribed memantine. She used to see a neurologist at [facility]. She does not recall ever doing any cognitive testing. She currently lives in a skilled nursing facility. Dr. Wilson referred her for my evaluation in this context, to aid in diagnosis and treatment planning.     Notes from a 2008 to 2008 hospitalization in the [facility] system lists relapsing-remitting MS as the primary diagnosis. They describe results of a brain MRI showing moderate  diffuse cerebral atrophy, mild ventriculomegaly, and extensive white matter abnormalities. EEG recordings showed left frontal slowing and no epileptogenic features. Skin biopsy ruled out CADASIL. CSF studies supported MS (oligoclonal band 12, CSF IgG index 2.67, total protein 68). Cognitive testing showed problems with processing speed, complex attention, nonverbal reasoning, learning efficiency, and complex problem-solving, seen as consistent with expectations for patients with MS. Reading, verbal reasoning, and verbal memory were seen as intact. A dementia diagnosis was reportedly not supported by the data. The records describe her as lacking insight into her condition. Occupational Therapy providers felt she was incapable of caring for herself and in need of 24/7 supervision. A psychiatric evaluation in July 2008 deemed her unable to make medical decisions on her own. She was discharged to a care facility.     The report from brain MRI on 9/30/2019 in our system reads,  1. Redemonstrated nonspecific extensive confluent nonenhancing/nonrestricting white matter signal abnormalities and moderate global brain parenchymal volume loss. Given the patient's history, this may be sequela of extensive demyelinating disease, advanced small vessel ischemic disease, or related to other nonspecific leukoencephalopathy. Clinical correlation is recommended. 2. No acute intracranial abnormality is identified.  Compared to outside scans on 10/22/2018, the radiologist did not see substantial changes.     In today s interview, Ms. Angel says she has no real concerns about her cognitive functioning. I was also able to speak with a staffer at her facility, Reggie, who has been a caregiver for her since April. He is not sure what her cognitive baseline was before he started working there. He thinks she has been living there for about 6 years. At a visit with Dr. Wilson, it was reported she had been there about 2 years. Today, Ms.  Stanley thinks it has been a little over 1 year, but her most common response to questions about timeframes for various events is  about a year.  She thinks she stopped driving about a year ago. She says it has  probably been a couple of years  since she stopped working. She has never been  and does not have children. She had been living with her mother until her mother s passing in 2007 or 2008, per records from [place]. It seems she has been in different care facilities since then.     Reggie says she gets confused about where she is within the facility. For example, yesterday she started taking her pants off to go to the bathroom while still in the group living room. She is independent for toileting but also incontinent without noticing (bladder and bowel), so staff needs to check her. For the most part, she seems able to participate in conversations, but then she surprises Reggie with how much of it she had missed or not remembered. She needs help with eating, because of the tremor. All of her medications are passed to her; Ms. Angel cannot tell me any of the medications she is on. Her current medication list includes memantine, citalopram, clonazepam, trazodone, primidone, vitamin D3, guaifenesin, and fiber laxative. She gets help with bathing. She gets help with dressing, and she tends to make clothing choices odd or inappropriate for the weather/context. Ms. Angel says she does not get any bathing or dressing assistance. There is 24/7 nursing coverage. She shares a room with another resident, and there seem to be no behavioral issues in social interactions. In fact, Reggie says,  She is probably the most laid back person I ve ever met.      The 2008 records from [place] also describe a history of treatment for depression and histrionic personality disorder. Today, Ms. Angel says she has never had any mental health concerns or treatment, ever. She is not aware that she is prescribed psychiatric medications.  Reggie says she can be really quiet and just sit around with her head hanging down. He is not sure if she is sad or fatigued in such times. The staff do not have concerns about suicidality or self-harm. There are no indications of hallucinatory experiences. Sleep is reportedly good and undisturbed overnight.     She reports no use of alcohol and no history of alcohol problems. She used to smoke about a pack of cigarettes per day. When she quit is unclear. She still gets a few cigarettes from another resident a couple of times per month. She reports no illicit drug use.     There is no known history of stroke, seizure, or TBI. She reports occasional migraines. She uses a walker and Reggie says she can manage stairs by herself. There have been no recent falls. Facility staff have not picked up on any changes to her smell, taste, hearing, or vision.     Her ability to convey remote history is limited. Ms. Angel tells me she felt like an average student when going through school. She notes that her father was superintendent of her district. After high school, she completed 2 years at [employer], taking general courses. 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  When asked when she stopped working, her initial reply is,  While I ran out of everything.  Pushed to make her best guess, she thinks it was probably a couple of years ago.     She is not aware of any family history of MS or other neurologic conditions.     BEHAVIORAL OBSERVATIONS    Ms. Angel was polite and cooperative with the evaluation. She was pleasant and agreeable in the interview, occasionally making jokes. She was a poor historian for recent and remote information. She tended to be mildly off-topic and gave completely non sequitur answers at times. Content of speech was vague and imprecise, through a mix of word-finding struggles and limited fund of information. Speech was slow but had normal prosody and articulation. She was alert  but demonstrated scattered, stimulus-bound attention. Comprehension of test instructions was reduced. Even when she did understand the initial instructions, she often became confused about what to do mid-task. She tended to be careless and impulsive. Her motivation and engagement shifted. She refused to try a reading test because she did not have her glasses, but she later did not have trouble trying any other visual tasks. On memory recall tasks, she simply stated,  I don t know, and I don t care.  However, given her lack of personal knowledge demonstrated during the interview, I do not see her trouble on memory tests as solely due to shifting effort or engagement. She may have been doing the best she could in the face of veritable cognitive impairments. The conditions of the assessment are not standard as the visit was conducted by videoconference instead of in person, which may render inaccurate any comparisons to standard normative data. However, I do feel the data reflect her current functional state.     MEASURES ADMINISTERED    The following measures were administered by a trained psychometrist, under my direct supervision:    Orientation: Time, Place, Basic Personal Information, Recent US Presidents; Wide Range Achievement Test 4: Word Reading (pt. refused); Wechsler Adult Intelligence Scales-IV: Vocabulary, Similarities, Matrix Reasoning, Digit Span; Controlled Oral Word Association Test; Animal Naming Test; [place]cott Naming Test; Complex Ideational Material; Oral Trail-Making Test; Test of Sustained Attention & Tracking; Clock Drawing; [place] Verbal Learning Test-Revised; Repeatable Battery for the Assessment of Neuropsychological Status: Immediate and Delayed Stories; ILS Health and Safety Questionnaire; Geriatric Depression Scale.    RESULTS AND INTERPRETATION    Orientation was exceptionally abnormal for time, stating the current year was 2009, having no guess for the date, being 2 days of for the day of  the week, and more than 3 hours off for the time of day. Orientation to place was limited to being in the [place] area. Orientation to basic personal information was limited to her birthdate; she stated her current age as 56 and had no idea what her current address might be. Orientation to basic cultural information was poor. She could not name the current president (said it was still Trump) and when asked to name any others from the last 40 years, her only guess was Hiram.     Abstract verbal analogical reasoning was low average. Demonstrating vocabulary knowledge was middle average. Practical reasoning for a variety of health and safety scenarios was very low. Visual reasoning through pattern identification was exceptionally low.     Clock drawing was abnormal, with no numbers written around the contour and the hands placed incorrectly for the requested time.     Immediate auditory attention and working memory were exceptionally low for repeating and rearranging digit strings. Performances were exceptionally low across a variety of brief verbal tasks requiring executive management of attention and concentration.     Confrontation naming was exceptionally low. Letter-based verbal fluency was exceptionally low. Category-based verbal fluency was exceptionally low. Oral comprehension and making inferences from sentences and brief paragraphs was below average.     Immediate verbal memory for short stories was below average, and delayed story recall appeared to be exceptionally low (stated no recollection). Learning a word list over repeated readings was below average, with no meaningful improvement across trials. Delayed free recall of the list appeared to be exceptionally low (stated no recollection). Delayed recognition of the list was exceptionally low, with more false-positive errors than true hits.     On a brief self-report inventory, she endorsed minimal symptoms related to depression and anxiety (GDS =  2/30).     IMPRESSIONS    In the context of nonstandard and limited assessment via video conference instead of in person (as necessitated by the current COVID-19 situation), the cognitive test results are abnormal.    Ms. Angel has one performance in the average range (vocabulary knowledge) and one performance in the low average range (analogical reasoning). All other performances are abnormally low, most suggesting severe impairment. There are serious problems with nonverbal reasoning, practical reasoning for health and safety, mental speed, attention, working memory, executive control, anterograde memory formation, lexical retrieval, visuoconstructional skills, and orientation to time, place, person, and basic cultural information.     The data are consistent with diffuse cerebral dysfunction from her widespread white matter lesions. She lacks insight into her condition and is a poor historian for recent and remote information. She is appropriately classified in a state of dementia. The staging is moderate. MS is the primary etiology.     RECOMMENDATIONS    Continued neurologic care is needed. I defer to Dr. Wilson on any medication changes.     She does not endorse any mental health concerns, but she is on multiple psychiatric medications. I defer to prescribing providers on their approach to symptom management, but it is clear that an external source of information is needed to determine if various symptoms are or are not present.     If not already in place, it would be appropriate to pursue guardianship designations. Ms. Angel does not possess sufficient cognitive capacity for reliable decision-making in her own best interests.     She appears to be in an appropriate living facility. She needs 24/7 skilled nursing care.     An up-to-date neuropsychological baseline has been obtained. I would be happy to see her again, whenever clinically indicated.     Pedro Gómez, PhD, LP, ABPP-CN  Board Certified in  Clinical Neuropsychology  Licensed Psychologist [id number]     Department of Rehabilitation Medicine  Division of Adult Neuropsychology  [facility]      Time spent: 44 minutes neurobehavioral status exam including interview, records review, and clinical assessment by licensed and board-certified neuropsychologist (CPT 36888). 116 minutes neuropsychological testing evaluation by licensed and board-certified neuropsychologist, including integration of patient data, interpretation of standardized test results and clinical data, clinical decision-making, treatment planning, report, and interactive feedback to the patient (CPT 46004, 21541). 188 minutes of psychological and neuropsychological test administration and scoring by technician (CPT 06250, 88121). Diagnoses: F02.80, G35

## 2021-03-10 NOTE — TELEPHONE ENCOUNTER
Action    Action Taken Received fax back from [facility] - they need documents stating Marge is patient's POA prior to releasing records.    Sent email to Marge stating this.          Started with fever 2 nights ago-yesterday was 103.4 and this afternoon is 101-not eating much-drinking but decreased-having wet diapers but decreased-runny nose-very crabby-restless thru the night, is up and down-made appt for tomorrow but if any respiratory distress tonight, will need to be seen . Use vaporizer/humidifier when sleeping-offer regular diet-push fluids, if taking smaller amounts then give more frequently-tylenol or motrin prn-lightly dressed-lukewarm bath prn.-mom agrees with plan.

## 2021-04-25 ENCOUNTER — HEALTH MAINTENANCE LETTER (OUTPATIENT)
Age: 71
End: 2021-04-25

## 2021-05-18 ENCOUNTER — RECORDS - HEALTHEAST (OUTPATIENT)
Dept: LAB | Facility: CLINIC | Age: 71
End: 2021-05-18

## 2021-05-21 LAB
ANION GAP SERPL CALCULATED.3IONS-SCNC: 10 MMOL/L (ref 5–18)
BASOPHILS # BLD AUTO: 0.1 THOU/UL (ref 0–0.2)
BASOPHILS NFR BLD AUTO: 1 % (ref 0–2)
BUN SERPL-MCNC: 35 MG/DL (ref 8–28)
CALCIUM SERPL-MCNC: 8.4 MG/DL (ref 8.5–10.5)
CHLORIDE BLD-SCNC: 106 MMOL/L (ref 98–107)
CO2 SERPL-SCNC: 24 MMOL/L (ref 22–31)
CREAT SERPL-MCNC: 0.66 MG/DL (ref 0.6–1.1)
EOSINOPHIL # BLD AUTO: 0.1 THOU/UL (ref 0–0.4)
EOSINOPHIL NFR BLD AUTO: 1 % (ref 0–6)
ERYTHROCYTE [DISTWIDTH] IN BLOOD BY AUTOMATED COUNT: 12.2 % (ref 11–14.5)
GFR SERPL CREATININE-BSD FRML MDRD: >60 ML/MIN/1.73M2
GLUCOSE BLD-MCNC: 88 MG/DL (ref 70–125)
HCT VFR BLD AUTO: 40 % (ref 35–47)
HGB BLD-MCNC: 12.6 G/DL (ref 12–16)
IMM GRANULOCYTES # BLD: 0 THOU/UL
IMM GRANULOCYTES NFR BLD: 0 %
LYMPHOCYTES # BLD AUTO: 1.7 THOU/UL (ref 0.8–4.4)
LYMPHOCYTES NFR BLD AUTO: 21 % (ref 20–40)
MCH RBC QN AUTO: 30.6 PG (ref 27–34)
MCHC RBC AUTO-ENTMCNC: 31.5 G/DL (ref 32–36)
MCV RBC AUTO: 97 FL (ref 80–100)
MONOCYTES # BLD AUTO: 0.4 THOU/UL (ref 0–0.9)
MONOCYTES NFR BLD AUTO: 5 % (ref 2–10)
NEUTROPHILS # BLD AUTO: 5.7 THOU/UL (ref 2–7.7)
NEUTROPHILS NFR BLD AUTO: 72 % (ref 50–70)
PLATELET # BLD AUTO: 223 THOU/UL (ref 140–440)
PMV BLD AUTO: 12.1 FL (ref 8.5–12.5)
POTASSIUM BLD-SCNC: 3.9 MMOL/L (ref 3.5–5)
RBC # BLD AUTO: 4.12 MILL/UL (ref 3.8–5.4)
SODIUM SERPL-SCNC: 140 MMOL/L (ref 136–145)
WBC: 7.9 THOU/UL (ref 4–11)

## 2021-06-14 DIAGNOSIS — G35 MULTIPLE SCLEROSIS (H): ICD-10-CM

## 2021-06-14 NOTE — TELEPHONE ENCOUNTER
[facility] Call Center    Phone Message    May a detailed message be left on voicemail: yes     Reason for Call: Medication Refill Request    Has the patient contacted the pharmacy for the refill? Yes   Name of medication being requested: clonazePAM (KLONOPIN) 0.5 MG tablet [18024] (Order [id number])  Provider who prescribed the medication: Dr. Wilson   Pharmacy: [facility] - [address] [place] [address].  Date medication is needed: anahi Cerna calling from the [facility] looking to have e-script sent to [facility] pharmacy.     Fax for pharmacy: [phone]    Phone for Pharmacy: [phone]    Please advise       Action Taken: Other:  MS    Travel Screening: Not Applicable

## 2021-06-14 NOTE — TELEPHONE ENCOUNTER
Rx pended to Ashley Gillespie on behalf of Dr. Wilson; Will also see when the patient should have a follow up appointment.    Sandi Wells, MS RN Care Coordinator

## 2021-06-15 RX ORDER — CLONAZEPAM 0.5 MG/1
0.5 TABLET ORAL 2 TIMES DAILY PRN
Qty: 60 TABLET | Refills: 3 | Status: SHIPPED | OUTPATIENT
Start: 2021-06-15 | End: 2022-05-11

## 2021-06-15 NOTE — TELEPHONE ENCOUNTER
[facility] Call Center    Phone Message    May a detailed message be left on voicemail: no     Reason for Call: Other: Nehemiah calling to make sure this prescription is sent to the correct pharmacy. Karen is needing this sent to [facility], [place] [address], ph [phone], fax [phone].     Action Taken: Message routed to:  [facility]): JORGE LUIS MS    Travel Screening: Not Applicable

## 2021-09-01 ENCOUNTER — OFFICE VISIT (OUTPATIENT)
Dept: NEUROLOGY | Facility: CLINIC | Age: 71
End: 2021-09-01
Attending: PSYCHIATRY & NEUROLOGY
Payer: COMMERCIAL

## 2021-09-01 VITALS
DIASTOLIC BLOOD PRESSURE: 95 MMHG | HEART RATE: 78 BPM | BODY MASS INDEX: 22.11 KG/M2 | SYSTOLIC BLOOD PRESSURE: 178 MMHG | OXYGEN SATURATION: 97 % | WEIGHT: 145.4 LBS

## 2021-09-01 DIAGNOSIS — F02.80 DEMENTIA DUE TO MEDICAL CONDITION WITHOUT BEHAVIORAL DISTURBANCE (H): ICD-10-CM

## 2021-09-01 DIAGNOSIS — G35 MULTIPLE SCLEROSIS (H): ICD-10-CM

## 2021-09-01 PROCEDURE — 99213 OFFICE O/P EST LOW 20 MIN: CPT | Mod: GC | Performed by: PSYCHIATRY & NEUROLOGY

## 2021-09-01 PROCEDURE — G0463 HOSPITAL OUTPT CLINIC VISIT: HCPCS

## 2021-09-01 RX ORDER — MEMANTINE HYDROCHLORIDE 10 MG/1
10 TABLET ORAL 2 TIMES DAILY
Qty: 60 TABLET | Refills: 3 | Status: SHIPPED | OUTPATIENT
Start: 2021-09-01

## 2021-09-01 ASSESSMENT — PATIENT HEALTH QUESTIONNAIRE - PHQ9: SUM OF ALL RESPONSES TO PHQ QUESTIONS 1-9: 0

## 2021-09-01 ASSESSMENT — PAIN SCALES - GENERAL: PAINLEVEL: NO PAIN (0)

## 2021-09-01 NOTE — PROGRESS NOTES
THE [facility] MULTIPLE SCLEROSIS CLINIC VISIT      PRINCIPAL NEUROLOGIC DIAGNOSIS: Multiple Sclerosis        HISTORY OF ILLNESS:    Ms. Angel is a 71 year old female with MS with significant cognitive impairment, cerebellar outflow tremor, and ataxia who presents for follow up with her sister. Last seen 9/3/2020 by video visit. At that time, the plan was to have a formal evaluation of her cognitive impairment and then start memantine. Unfortunately, they haven't been able to get the evaluation and so have not started the medication.    Everything else is stable. She feels her tremors are controlled well enough and doesn't want more meds. Very unsteady on her feet but no falls. Using walker consistently. No significant worsening of cognition. Continues to have urniary incontinence and wears adult briefs, but patient denies concerns and no complaints from her facility so they assume everything is fine.     She is happy with where she lives and goes to adult day programs ([facility] in [place]) for activities and socialization.    She has had her COVID vaccine.      Current Symptoms:  1. Cognitive impairment        PAST HISTORY:  Past Medical History:   Diagnosis Date     Abnormal brain MRI 9/30/2019    Narrative & Impression   MRI BRAIN WITHOUT AND WITH CONTRAST  9/30/2019 11:17 AM    HISTORY:  Neuro deficit(s), subacute. Tremor. Multiple sclerosis (H).   TECHNIQUE:  Multiplanar, multisequence MRI of the brain without and with 7 mL Gadavist.   COMPARISON: Outside MRI head 10/22/2018.   FINDINGS: There is no restricted diffusion to suggest acute infarct. Confluent areas of T2/T2 FLAIR signal abno     Dementia (H)      Migraine      Multiple sclerosis, primary progressive (H)        Past Surgical History:   Procedure Laterality Date     CHOLECYSTECTOMY       HYSTERECTOMY                Current Outpatient Prescriptions:  Current Outpatient Medications   Medication     citalopram (CELEXA) 20 MG  tablet     clonazePAM (KLONOPIN) 0.5 MG tablet     FIBER LAXATIVE 0.52 g capsule     guaiFENesin (MUCINEX) 600 MG 12 hr tablet     memantine (NAMENDA) 10 MG tablet     primidone (MYSOLINE) 50 MG tablet     traZODone (DESYREL) 100 MG tablet     vitamin D3 (CHOLECALCIFEROL) 2000 units (50 mcg) tablet     No current facility-administered medications for this visit.          ALLERGIES     No Known Allergies      Social History    Social History     Socioeconomic History     Marital status: Single     Spouse name: Not on file     Number of children: Not on file     Years of education: Not on file     Highest education level: Not on file   Occupational History     Occupation: unemployed   Tobacco Use     Smoking status: Current Some Day Smoker     Packs/day: 1.00     Years: 20.00     Pack years: 20.00     Types: Cigarettes     Start date: 1/1/1967     Smokeless tobacco: Never Used   Substance and Sexual Activity     Alcohol use: Not Currently     Drug use: Never     Sexual activity: Not on file   Other Topics Concern     Not on file   Social History Narrative    Single. Lives in [place] at [facility] an assisted living facility sine 2014.     Prior to her hospitalization in 2008 she worked as a  that afterward at [employer].        She attended the [employer] for 2 years.         FAMILY HISTORY     Family History   Problem Relation Age of Onset     Myocardial Infarction Mother      Hypertension Mother      Heart Failure Father          REVIEW OF SYSTEMS:    Comprehensive review of systems otherwise was negative, including constitutional, head and neck, cardiovascular, pulmonary, gastrointestinal, endocrine, urologic, reproductive, rheumatic, hematologic, immunologic, dermatologic, and psychiatric.    Nutritional concerns: None  Driving issues: None   Safety concerns regarding living situations and safety at home: None  Risk of falls: Moderate  Pain: None    PHYSICAL EXAM:    Hair, skin, nails,  and joints were normal.    The patient was alert and Attentive. Spontaneous speech was somewhat limited but comprehension to conversation and simple commands is intact. Needs more assistance with more complex commands. Affect was normal. Mood is very positive.        Visual fields were full to confrontation.   Pupils were symmetric and briskly reactive.  Extraocular movements: Intact without KEVIN  Facial movement was fully intact and symmetric.  Muscles of facial expression were normal  Hearing was normal. Palatal movements were normal. Shoulder shrug intact. Tongue movements were normal. There was no dysarthria.          Motor    Upper      Right Left   Shoulder Abduction 5 5   Elbow Flexion 5 5   Elbow Extension 5 5   Wrist Extension 5 5   Digit Extension 4 4   Digit Flexion 4 4   Finger abduction 4 4   Tone normal normal   Lower       Right Left   Hip Flexion 4 5   Knee Extension 5 5   Knee Flexion 4 5   Foot Dorsiflexion 5 5   Foot Plantar Flexion 5 5   Tone Mildly increased Mildly increased             Reflexes:     Reflexes       Right  Left   Biceps 2+  2+   Brachioradialis 3+  3+   Patellar  3+  3+   Achilles 3+  3+   Clonus Few beats  Several          Coordination:     Right Left   Rapid finger tapping Mildly impaired Impaired   Rapid foot tapping Moderately impaired Moderately impaired       Sensory examination:  Light touch:  Intact in all extremities    Gait: Short shuffling stride. Extremely cautious with multistep turn. Using walker consistently.            ASSESSMENT:  Ms. Angel is a 70yo woman with significant cognitive impairment and mild-moderate physical disability from MS which appears generally stable. She is not on any medications to  Prevent relapses as they are unlikely at her age.     PLAN:  Start memantine 5mg twice a day for one month. Then increase to 10mg in the morning and 5mg at night for 1 month. Then increase to 10mg per day      Finally I will follow the patient up in 3 month(s) by  video as long as the patient is doing well. I instructed the patient to call or mychart my office with any concerns or questions.    This patient was evaluated by Neurology PGY-4 Tricia Valdovinos MD       I saw and evaluated the patient with the resident and agree with the assessment and plan.     Irais Wilson MD  Chief, Multiple Sclerosis Division  Department of Neurology  [facility] [place]

## 2021-09-01 NOTE — PATIENT INSTRUCTIONS
-Start memantine 5mg twice a day for one month. Then increase to 10mg in the morning and 5mg at night for 1 month. Then increase to 10mg per day  -Follow up in clinic in 3 months by video

## 2021-09-01 NOTE — LETTER
9/1/2021       RE: Karen Angel  [place] [address] [facility]  [place] [facility] [address]     Dear Colleague,    Thank you for referring your patient, Karen Angel, to the [facility] MULTIPLE SCLEROSIS CLINIC [place] at [facility]. Please see a copy of my visit note below.    THE [facility] MULTIPLE SCLEROSIS CLINIC VISIT    PRINCIPAL NEUROLOGIC DIAGNOSIS: Multiple Sclerosis    HISTORY OF ILLNESS:    Ms. Angel is a 71 year old female with MS with significant cognitive impairment, cerebellar outflow tremor, and ataxia who presents for follow up with her sister. Last seen 9/3/2020 by video visit. At that time, the plan was to have a formal evaluation of her cognitive impairment and then start memantine. Unfortunately, they haven't been able to get the evaluation and so have not started the medication.    Everything else is stable. She feels her tremors are controlled well enough and doesn't want more meds. Very unsteady on her feet but no falls. Using walker consistently. No significant worsening of cognition. Continues to have urniary incontinence and wears adult briefs, but patient denies concerns and no complaints from her facility so they assume everything is fine.     She is happy with where she lives and goes to adult day programs ([facility] in [place]) for activities and socialization.    She has had her COVID vaccine.    Current Symptoms:  1. Cognitive impairment    PAST HISTORY:  Past Medical History:   Diagnosis Date     Abnormal brain MRI 9/30/2019    Narrative & Impression   MRI BRAIN WITHOUT AND WITH CONTRAST  9/30/2019 11:17 AM    HISTORY:  Neuro deficit(s), subacute. Tremor. Multiple sclerosis (H).   TECHNIQUE:  Multiplanar, multisequence MRI of the brain without and with 7 mL Gadavist.   COMPARISON: Outside MRI head 10/22/2018.   FINDINGS: There is no restricted diffusion to suggest acute infarct. Confluent areas  of T2/T2 FLAIR signal abno     Dementia (H)      Migraine      Multiple sclerosis, primary progressive (H)        Past Surgical History:   Procedure Laterality Date     CHOLECYSTECTOMY       HYSTERECTOMY       Current Outpatient Prescriptions:  Current Outpatient Medications   Medication     citalopram (CELEXA) 20 MG tablet     clonazePAM (KLONOPIN) 0.5 MG tablet     FIBER LAXATIVE 0.52 g capsule     guaiFENesin (MUCINEX) 600 MG 12 hr tablet     memantine (NAMENDA) 10 MG tablet     primidone (MYSOLINE) 50 MG tablet     traZODone (DESYREL) 100 MG tablet     vitamin D3 (CHOLECALCIFEROL) 2000 units (50 mcg) tablet     No current facility-administered medications for this visit.     ALLERGIES     No Known Allergies    Social History    Social History     Socioeconomic History     Marital status: Single     Spouse name: Not on file     Number of children: Not on file     Years of education: Not on file     Highest education level: Not on file   Occupational History     Occupation: unemployed   Tobacco Use     Smoking status: Current Some Day Smoker     Packs/day: 1.00     Years: 20.00     Pack years: 20.00     Types: Cigarettes     Start date: 1/1/1967     Smokeless tobacco: Never Used   Substance and Sexual Activity     Alcohol use: Not Currently     Drug use: Never     Sexual activity: Not on file   Other Topics Concern     Not on file   Social History Narrative    Single. Lives in [place] at [facility] an assisted living facility sine 2014.     Prior to her hospitalization in 2008 she worked as a  that afterward at [employer].        She attended the [employer] for 2 years.     FAMILY HISTORY     Family History   Problem Relation Age of Onset     Myocardial Infarction Mother      Hypertension Mother      Heart Failure Father      REVIEW OF SYSTEMS:    Comprehensive review of systems otherwise was negative, including constitutional, head and neck, cardiovascular, pulmonary,  gastrointestinal, endocrine, urologic, reproductive, rheumatic, hematologic, immunologic, dermatologic, and psychiatric.    Nutritional concerns: None  Driving issues: None   Safety concerns regarding living situations and safety at home: None  Risk of falls: Moderate  Pain: None    PHYSICAL EXAM:    Hair, skin, nails, and joints were normal.    The patient was alert and Attentive. Spontaneous speech was somewhat limited but comprehension to conversation and simple commands is intact. Needs more assistance with more complex commands. Affect was normal. Mood is very positive.        Visual fields were full to confrontation.   Pupils were symmetric and briskly reactive.  Extraocular movements: Intact without KEVIN  Facial movement was fully intact and symmetric.  Muscles of facial expression were normal  Hearing was normal. Palatal movements were normal. Shoulder shrug intact. Tongue movements were normal. There was no dysarthria.    Motor    Upper      Right Left   Shoulder Abduction 5 5   Elbow Flexion 5 5   Elbow Extension 5 5   Wrist Extension 5 5   Digit Extension 4 4   Digit Flexion 4 4   Finger abduction 4 4   Tone normal normal   Lower       Right Left   Hip Flexion 4 5   Knee Extension 5 5   Knee Flexion 4 5   Foot Dorsiflexion 5 5   Foot Plantar Flexion 5 5   Tone Mildly increased Mildly increased       Reflexes:     Reflexes       Right  Left   Biceps 2+  2+   Brachioradialis 3+  3+   Patellar  3+  3+   Achilles 3+  3+   Clonus Few beats  Several      Coordination:     Right Left   Rapid finger tapping Mildly impaired Impaired   Rapid foot tapping Moderately impaired Moderately impaired       Sensory examination:  Light touch:  Intact in all extremities    Gait: Short shuffling stride. Extremely cautious with multistep turn. Using walker consistently.    ASSESSMENT:  Ms. Angel is a 72yo woman with significant cognitive impairment and mild-moderate physical disability from MS which appears generally stable.  She is not on any medications to  Prevent relapses as they are unlikely at her age.     PLAN:  Start memantine 5mg twice a day for one month. Then increase to 10mg in the morning and 5mg at night for 1 month. Then increase to 10mg per day      Finally I will follow the patient up in 3 month(s) by video as long as the patient is doing well. I instructed the patient to call or mychart my office with any concerns or questions.    This patient was evaluated by Neurology PGY-4 Tricia Valdovinos MD       I saw and evaluated the patient with the resident and agree with the assessment and plan.     Irais Wilson MD  Chief, Multiple Sclerosis Division  Department of Neurology  [facility]        Again, thank you for allowing me to participate in the care of your patient.      Sincerely,    Irais Wilson MD

## 2021-10-10 ENCOUNTER — HEALTH MAINTENANCE LETTER (OUTPATIENT)
Age: 71
End: 2021-10-10

## 2022-02-17 PROBLEM — R90.89 ABNORMAL BRAIN MRI: Status: ACTIVE | Noted: 2019-09-30

## 2022-03-17 PROCEDURE — 81001 URINALYSIS AUTO W/SCOPE: CPT | Mod: ORL | Performed by: NURSE PRACTITIONER

## 2022-03-17 PROCEDURE — 87086 URINE CULTURE/COLONY COUNT: CPT | Mod: ORL | Performed by: NURSE PRACTITIONER

## 2022-03-18 ENCOUNTER — LAB REQUISITION (OUTPATIENT)
Dept: LAB | Facility: CLINIC | Age: 72
End: 2022-03-18
Payer: COMMERCIAL

## 2022-03-18 LAB
ALBUMIN UR-MCNC: NEGATIVE MG/DL
APPEARANCE UR: ABNORMAL
BACTERIA #/AREA URNS HPF: ABNORMAL /HPF
BILIRUB UR QL STRIP: NEGATIVE
CAOX CRY #/AREA URNS HPF: ABNORMAL /HPF
COLOR UR AUTO: YELLOW
GLUCOSE UR STRIP-MCNC: NEGATIVE MG/DL
HGB UR QL STRIP: NEGATIVE
KETONES UR STRIP-MCNC: NEGATIVE MG/DL
LEUKOCYTE ESTERASE UR QL STRIP: ABNORMAL
MUCOUS THREADS #/AREA URNS LPF: PRESENT /LPF
NITRATE UR QL: NEGATIVE
PH UR STRIP: 6 [PH] (ref 5–7)
RBC URINE: 1 /HPF
SP GR UR STRIP: 1.02 (ref 1–1.03)
SQUAMOUS EPITHELIAL: <1 /HPF
UROBILINOGEN UR STRIP-MCNC: <2 MG/DL
WBC URINE: 23 /HPF

## 2022-03-21 LAB
BACTERIA UR CULT: ABNORMAL
BACTERIA UR CULT: ABNORMAL

## 2022-03-26 ENCOUNTER — HEALTH MAINTENANCE LETTER (OUTPATIENT)
Age: 72
End: 2022-03-26

## 2022-05-10 DIAGNOSIS — G35 MULTIPLE SCLEROSIS (H): ICD-10-CM

## 2022-05-10 NOTE — CONFIDENTIAL NOTE
Received refill request for clonazepam to be sent to [facility]; Previously prescribed by Dr Wilson. Patient was last seen in September by Dr Wilson and has follow up appointment in August with Dr Covarrubias. Rx request routed to Dr Covarrubias.    Thu Arce RN

## 2022-05-10 NOTE — TELEPHONE ENCOUNTER
[facility]    Phone Message    May a detailed message be left on voicemail: yes     Reason for Call: Medication Refill Request    Refill request of     Dr. Elver Covarrubias    Patient has appointment with Dr. Covarrubias 8/16/22    Medication: Klonopin 0.5 mg    Pharmacy:  [facility] - [place], [place]  Action Taken: Message routed to:  [facility] ([facility]):  MS    Travel Screening: Not Applicable

## 2022-05-11 RX ORDER — CLONAZEPAM 0.5 MG/1
0.5 TABLET ORAL 2 TIMES DAILY PRN
Qty: 60 TABLET | Refills: 3 | Status: SHIPPED | OUTPATIENT
Start: 2022-05-11

## 2022-05-21 ENCOUNTER — HEALTH MAINTENANCE LETTER (OUTPATIENT)
Age: 72
End: 2022-05-21

## 2022-08-16 ENCOUNTER — MEDICAL CORRESPONDENCE (OUTPATIENT)
Dept: NEUROLOGY | Facility: CLINIC | Age: 72
End: 2022-08-16

## 2022-08-16 ENCOUNTER — OFFICE VISIT (OUTPATIENT)
Dept: NEUROLOGY | Facility: CLINIC | Age: 72
End: 2022-08-16
Attending: PSYCHIATRY & NEUROLOGY
Payer: COMMERCIAL

## 2022-08-16 VITALS
WEIGHT: 137.2 LBS | DIASTOLIC BLOOD PRESSURE: 87 MMHG | OXYGEN SATURATION: 92 % | SYSTOLIC BLOOD PRESSURE: 137 MMHG | HEART RATE: 108 BPM | BODY MASS INDEX: 20.86 KG/M2

## 2022-08-16 DIAGNOSIS — G35 MS (MULTIPLE SCLEROSIS) (H): Primary | ICD-10-CM

## 2022-08-16 DIAGNOSIS — R25.1 TREMOR: ICD-10-CM

## 2022-08-16 PROCEDURE — 99215 OFFICE O/P EST HI 40 MIN: CPT | Mod: GC | Performed by: PSYCHIATRY & NEUROLOGY

## 2022-08-16 RX ORDER — TRIAMCINOLONE ACETONIDE 0.25 MG/G
OINTMENT TOPICAL 2 TIMES DAILY
COMMUNITY

## 2022-08-16 RX ORDER — AMLODIPINE BESYLATE 5 MG/1
5 TABLET ORAL DAILY
COMMUNITY

## 2022-08-16 RX ORDER — ROSUVASTATIN CALCIUM 40 MG/1
40 TABLET, COATED ORAL DAILY
COMMUNITY

## 2022-08-16 ASSESSMENT — PAIN SCALES - GENERAL: PAINLEVEL: NO PAIN (0)

## 2022-08-16 NOTE — PROGRESS NOTES
Neurology Clinic Visit      08/16/2022   Source of information: Patient and chart review      History of Present Symptom:  Karen Angel is a 72 year old female with a PMH significant for primary progressive multiple sclerosis who presents for follow up. She previously saw Dr. Wilson, last visit was 9/1/21.      History is limited due however per chart review sister noted decline in 2002, particularly balance and memory which slowly worsened. She did not have health insurance so interaction with healthcare was limited. She retired from her part time job at [employer] around 2002. She was hospitalized after police pulled her over for driving erradically. This ultimately led to diagnosis of MS at [facility]. MRI showed diffuse white matter abnormality and atrophy. CSF showed 12 oligoclonal bands. EEG with left frontal slowing but now abnormal discharges. Skin biopsy was done to rule out CADASIL. Cognitive testing was most consistent with pattern seen in patients with MS, less likely a degenerative dementia. Psychiatric eval 2008 found her unable to make medical decisions and she was discharged to a care facility.      She has prominent cerebellar outflow tremor. Initially treated with carbidopa/levodopa, but this was discontinued by Dr. Sevilla with movement disorders. Primidone trial was done with modest if any benefit. Her other symptoms include urinary incontinence with urinary pad at all times and significant cognitive impairment.     She had a stroke April 2022 with left sided visual deficit and left sided neglect. She was evaluated at [facility]. She has a stroke neurologist there. She was started on aspirin and amlodipine was increased. She has been working with PT. She has become somewhat less ambulatory since the stroke, not due to weakness but due to difficulty with neglect which has been difficult to manage at the care facility. She is getting new walker because her old one broke. She thinks she would be  able to walk about 10 feet with a walker.     Memantidine may be helpful. Her sister notes a stabilization of her cognitive changes since starting this medication.     Disease onset: 2002 (diagnosed around 2009)   Previous disease modifying therapy: None       The patient's medical, surgical, social, and family history were personally reviewed with the patient.  Past Medical History:   Diagnosis Date     Abnormal brain MRI 9/30/2019    Narrative & Impression   MRI BRAIN WITHOUT AND WITH CONTRAST  9/30/2019 11:17 AM    HISTORY:  Neuro deficit(s), subacute. Tremor. Multiple sclerosis (H).   TECHNIQUE:  Multiplanar, multisequence MRI of the brain without and with 7 mL Gadavist.   COMPARISON: Outside MRI head 10/22/2018.   FINDINGS: There is no restricted diffusion to suggest acute infarct. Confluent areas of T2/T2 FLAIR signal abno     Dementia (H)      Migraine      Multiple sclerosis, primary progressive (H)       Past Surgical History:   Procedure Laterality Date     CHOLECYSTECTOMY       HYSTERECTOMY       Social History     Tobacco Use     Smoking status: Current Some Day Smoker     Packs/day: 1.00     Years: 20.00     Pack years: 20.00     Types: Cigarettes     Start date: 1/1/1967     Smokeless tobacco: Never Used   Substance Use Topics     Alcohol use: Not Currently     Drug use: Never     Family History   Problem Relation Age of Onset     Myocardial Infarction Mother      Hypertension Mother      Heart Failure Father      Current Outpatient Medications   Medication     citalopram (CELEXA) 20 MG tablet     clonazePAM (KLONOPIN) 0.5 MG tablet     FIBER LAXATIVE 0.52 g capsule     guaiFENesin (MUCINEX) 600 MG 12 hr tablet     memantine (NAMENDA) 10 MG tablet     primidone (MYSOLINE) 50 MG tablet     traZODone (DESYREL) 100 MG tablet     vitamin D3 (CHOLECALCIFEROL) 2000 units (50 mcg) tablet     No current facility-administered medications for this visit.     No Known Allergies      Review of Systems:  14-point  review of systems was completed. The pertinent positives and negatives are in the HPI.    Physical Examination   Vitals: There were no vitals taken for this visit.  General: Patient appears comfortable in no acute distress.   HEENT: NC/AT, no icterus, moist mucous membranes  Chest: non-labored on RA  Extremities: Warm, no edema  Skin: No rash or lesion   Psych: Affect appropriate for situation   Neuro:  Mental status: Awake, alert, attentive. Looks to sister for details. Answers 90% of questions appropraitly but does get confused. Some difficulty following commands. Some socially atypical behaviors.   Cranial nerves: PERRL with no relative afferent pupillary defect, conjugate gaze, EOMI, visual fields intact roughly with left side extinction, face symmetric, shoulder shrug strong, tongue protrusion/uvula midline, no dysarthria.   Motor: Course cerebellar outflow tremor right hand worse than left, with also movement in her neck.  Good strength in her right body, difficulty attending to participate fully in the left body. At lead antigravity with some resistance in all muscle groups but difficulty with formal testing.   Reflexes: Brisk reflexes symmetric in biceps, brachioradialis, patellae, and achilles. No clonus.  Sensory: Intact to light touch with some sensory extinction on the left.   Laboratory:  All laboratory data reviewed    Imaging:    MRI brain 9/30/2019  IMPRESSION:  1. Redemonstrated nonspecific extensive confluent  nonenhancing/nonrestricting white matter signal abnormalities and  moderate global brain parenchymal volume loss. Given the patient's  history, this may be sequela of extensive demyelinating disease,  advanced small vessel ischemic disease, or related to other  nonspecific leukoencephalopathy. Clinical correlation is recommended.  2. No acute intracranial abnormality is identified.    MRI c-spine 2018 with multiple T2 lesions which appear chronic and some central canal stenosis.      Assessment/Plan:  Karen Angel is a 72 year old female who presents for follow up for primary progressive multiple sclerosis. She had a stroke this past April resulting in left sided neglect. She is following with stroke neurology for this.     We discussed the course of primary progressive MS. We discussed Ocrevus which is FDA approved based on studies with modest benefit with patients <55 who had symptoms for less than 10 years and who were still ambulatory. We discussed risk with immune suppressive therapy in her age group and that given this risk the limited benefit it may provide her would not likely be worth it.     We discussed her cerebellar outflow tremor which does impact her, particularly with regards to being able to feed herself. She is on Primidone but it is not likely doing much for her. We discussed a trail off of this medication as it can have cognitive side effects. She is working with PT to get weighted utensils.     -stop primidone   -continue memantine   -continue asa for stroke prevention   -follow up 1 year     Patient seen and discussed with Dr. Covarrubias.   I have reviewed the plan with the patient, who is in agreement.      Evelyn Maki, DO  Multiple Sclerosis Fellow      I saw and examined this patient, and have read and edited the resident's note.  I agree with the findings, assessment, and plan.  I spent 44 minutes on her care today, including chart review and face to face time.  Karen has what has been felt to be the primary progressive form of MS, with the most prominent symptoms being cognitive impairment and tremor.  MRI shows confluent white matter lesions, severe corpus callosum atrophy.  We discussed ocrelizumab in primary progressive MS, how it does not improve symptoms but provides slightly better odds of remaining clinically stable.  I told them if she was still clearly progressing (unlikely at age 72) I would consider it, but think it's unlikely to help her and does  have associated risks.  Will observe clinically for now.    Elver Covarrubias MD

## 2022-08-16 NOTE — LETTER
8/16/2022       RE: Karen Angel  [address]  [place] [address]     Dear Colleague,    Thank you for referring your patient, Karen Angel, to the [facility] MULTIPLE SCLEROSIS CLINIC [place] at [facility]. Please see a copy of my visit note below.      Neurology Clinic Visit      08/16/2022   Source of information: Patient and chart review      History of Present Symptom:  Karen Angel is a 72 year old female with a PMH significant for primary progressive multiple sclerosis who presents for follow up. She previously saw Dr. Wilson, last visit was 9/1/21.      History is limited due however per chart review sister noted decline in 2002, particularly balance and memory which slowly worsened. She did not have health insurance so interaction with healthcare was limited. She retired from her part time job at [employer] around 2002. She was hospitalized after police pulled her over for driving erradically. This ultimately led to diagnosis of MS at [facility]. MRI showed diffuse white matter abnormality and atrophy. CSF showed 12 oligoclonal bands. EEG with left frontal slowing but now abnormal discharges. Skin biopsy was done to rule out CADASIL. Cognitive testing was most consistent with pattern seen in patients with MS, less likely a degenerative dementia. Psychiatric eval 2008 found her unable to make medical decisions and she was discharged to a care facility.      She has prominent cerebellar outflow tremor. Initially treated with carbidopa/levodopa, but this was discontinued by Dr. Sevilla with movement disorders. Primidone trial was done with modest if any benefit. Her other symptoms include urinary incontinence with urinary pad at all times and significant cognitive impairment.     She had a stroke April 2022 with left sided visual deficit and left sided neglect. She was evaluated at [facility]. She has a stroke neurologist there. She  was started on aspirin and amlodipine was increased. She has been working with PT. She has become somewhat less ambulatory since the stroke, not due to weakness but due to difficulty with neglect which has been difficult to manage at the care facility. She is getting new walker because her old one broke. She thinks she would be able to walk about 10 feet with a walker.     Memantidine may be helpful. Her sister notes a stabilization of her cognitive changes since starting this medication.     Disease onset: 2002 (diagnosed around 2009)   Previous disease modifying therapy: None       The patient's medical, surgical, social, and family history were personally reviewed with the patient.  Past Medical History:   Diagnosis Date     Abnormal brain MRI 9/30/2019    Narrative & Impression   MRI BRAIN WITHOUT AND WITH CONTRAST  9/30/2019 11:17 AM    HISTORY:  Neuro deficit(s), subacute. Tremor. Multiple sclerosis (H).   TECHNIQUE:  Multiplanar, multisequence MRI of the brain without and with 7 mL Gadavist.   COMPARISON: Outside MRI head 10/22/2018.   FINDINGS: There is no restricted diffusion to suggest acute infarct. Confluent areas of T2/T2 FLAIR signal abno     Dementia (H)      Migraine      Multiple sclerosis, primary progressive (H)       Past Surgical History:   Procedure Laterality Date     CHOLECYSTECTOMY       HYSTERECTOMY       Social History     Tobacco Use     Smoking status: Current Some Day Smoker     Packs/day: 1.00     Years: 20.00     Pack years: 20.00     Types: Cigarettes     Start date: 1/1/1967     Smokeless tobacco: Never Used   Substance Use Topics     Alcohol use: Not Currently     Drug use: Never     Family History   Problem Relation Age of Onset     Myocardial Infarction Mother      Hypertension Mother      Heart Failure Father      Current Outpatient Medications   Medication     citalopram (CELEXA) 20 MG tablet     clonazePAM (KLONOPIN) 0.5 MG tablet     FIBER LAXATIVE 0.52 g capsule      guaiFENesin (MUCINEX) 600 MG 12 hr tablet     memantine (NAMENDA) 10 MG tablet     primidone (MYSOLINE) 50 MG tablet     traZODone (DESYREL) 100 MG tablet     vitamin D3 (CHOLECALCIFEROL) 2000 units (50 mcg) tablet     No current facility-administered medications for this visit.     No Known Allergies      Review of Systems:  14-point review of systems was completed. The pertinent positives and negatives are in the HPI.    Physical Examination   Vitals: There were no vitals taken for this visit.  General: Patient appears comfortable in no acute distress.   HEENT: NC/AT, no icterus, moist mucous membranes  Chest: non-labored on RA  Extremities: Warm, no edema  Skin: No rash or lesion   Psych: Affect appropriate for situation   Neuro:  Mental status: Awake, alert, attentive. Looks to sister for details. Answers 90% of questions appropraitly but does get confused. Some difficulty following commands. Some socially atypical behaviors.   Cranial nerves: PERRL with no relative afferent pupillary defect, conjugate gaze, EOMI, visual fields intact roughly with left side extinction, face symmetric, shoulder shrug strong, tongue protrusion/uvula midline, no dysarthria.   Motor: Course cerebellar outflow tremor right hand worse than left, with also movement in her neck.  Good strength in her right body, difficulty attending to participate fully in the left body. At lead antigravity with some resistance in all muscle groups but difficulty with formal testing.   Reflexes: Brisk reflexes symmetric in biceps, brachioradialis, patellae, and achilles. No clonus.  Sensory: Intact to light touch with some sensory extinction on the left.   Laboratory:  All laboratory data reviewed    Imaging:    MRI brain 9/30/2019  IMPRESSION:  1. Redemonstrated nonspecific extensive confluent  nonenhancing/nonrestricting white matter signal abnormalities and  moderate global brain parenchymal volume loss. Given the patient's  history, this may be  sequela of extensive demyelinating disease,  advanced small vessel ischemic disease, or related to other  nonspecific leukoencephalopathy. Clinical correlation is recommended.  2. No acute intracranial abnormality is identified.    MRI c-spine 2018 with multiple T2 lesions which appear chronic and some central canal stenosis.     Assessment/Plan:  Karen Angle is a 72 year old female who presents for follow up for primary progressive multiple sclerosis. She had a stroke this past April resulting in left sided neglect. She is following with stroke neurology for this.     We discussed the course of primary progressive MS. We discussed Ocrevus which is FDA approved based on studies with modest benefit with patients <55 who had symptoms for less than 10 years and who were still ambulatory. We discussed risk with immune suppressive therapy in her age group and that given this risk the limited benefit it may provide her would not likely be worth it.     We discussed her cerebellar outflow tremor which does impact her, particularly with regards to being able to feed herself. She is on Primidone but it is not likely doing much for her. We discussed a trail off of this medication as it can have cognitive side effects. She is working with PT to get weighted utensils.     -stop primidone   -continue memantine   -continue asa for stroke prevention   -follow up 1 year     Patient seen and discussed with Dr. Covarrubias.   I have reviewed the plan with the patient, who is in agreement.      Evelyn Maki, DO  Multiple Sclerosis Fellow      I saw and examined this patient, and have read and edited the resident's note.  I agree with the findings, assessment, and plan.  I spent 44 minutes on her care today, including chart review and face to face time.  Karen has what has been felt to be the primary progressive form of MS, with the most prominent symptoms being cognitive impairment and tremor.  MRI shows confluent white matter  lesions, severe corpus callosum atrophy.  We discussed ocrelizumab in primary progressive MS, how it does not improve symptoms but provides slightly better odds of remaining clinically stable.  I told them if she was still clearly progressing (unlikely at age 72) I would consider it, but think it's unlikely to help her and does have associated risks.  Will observe clinically for now.      Elver Covarrubias MD

## 2022-09-18 ENCOUNTER — HEALTH MAINTENANCE LETTER (OUTPATIENT)
Age: 72
End: 2022-09-18

## 2023-03-14 ENCOUNTER — HOSPITAL ENCOUNTER (OUTPATIENT)
Dept: REHABILITATION | Facility: CLINIC | Age: 73
Discharge: HOME | End: 2023-03-14
Attending: PHYSICIAN ASSISTANT
Payer: MEDICAID

## 2023-03-14 PROCEDURE — S5102 ADULT DAY CARE PER DIEM: HCPCS

## 2023-03-14 NOTE — PROGRESS NOTES
30 Day Preliminary Service Plan    The Preliminary Service Plan must be completed within 30 days of admission.     Date of Admission: 3/14/2023    1. Scheduled days of participants attendance at the center: Tuesday and Thursday    2. Transportation Arrangements: [employer]  Phone number: [phone]    3.  Nutritional Needs:     Dietary restrictions: No   Swallowing restrictions: No      4.  Program Participation:  Member will participate daily scheduled activities at the center.  Member is eager to participate in the arts program.      5. Caregiver role in caring out service: ILS worker will make sure her rides are set up for every Tuesday and Thursday.  CM has given authorization for her to attend 2x/week.

## 2023-03-14 NOTE — PROGRESS NOTES
"SELF-PRESERVATION FORM  [facility]    Member Name: Karen Angel; YOB: 1950  MRN: [id number]  3/14/2023    A. The person is ambulatory or mobile. No  B. The person has the combined physical and mental capacity to:  1. Recognize a danger, signal or alarm requiring evacuation from the center: No  2. Initiate and complete the evacuation without requiring more than sporadic assistance from another person, such as help in opening a door or getting into a wheelchair: No  3. Select an alternative means of escape or take another appropriate action if the primary escape route is blocked: No  4. Remain at a designated location outside the center until further instruction is given: No    \"Capable of taking appropriate action for self-preservation under emergency conditions\" is the designation applied in parts 1654.2683 to 8056.0611 to an adult who meets the criteria in items A and B.    FAC Self-Preservation Status: Not capable of self-preservation    "

## 2023-03-14 NOTE — PROGRESS NOTES
Needs Assessment    The needs assessment must be completed within 30 days of admission   Date of Admission: 3/14/2023  Date of Assessment: 3/14/2023  Eating:    How much assistance is needed with self-feeding?  Independent-min assist depending on how her tremors are that day.  She prefers to drink from a straw.     Do you have any food allergies? No allergies    Do you have any swallowing precautions? No. Regular Diet    Falls:      Have you fallen 2 or more times in the past year? No    Have you fallen and gotten hurt in the past year? No    TUG score if needed:  Non-ambulatory for that distance    Ambulation:    Are you able to ambulate? Non-ambulatory    Do you need a physical device when walking? No- in a manual wheelchair    Mobility:     What type of wheelchair will you use at the day program? Manual. Will need assistance with distance.     For safety, do you need assistance maneuvering your wheelchair? yes    Medication:    Will you need assistance with medication during your day program hours? No.  She will not be taking any meds while she is at the day program.    Dressing:    How much assistance is needed with upper body dressing? Min- mod assistance for cuing and sequencing    How much assistance is needed with lower body dressing? Min- mod assistance for cuing    Toileting:    Do you have a catheter? No    If so, can you empty the catheter yourself? NA    How much assistance is needed with a toilet transfer? Min assist of one    Do you need a lift for a toilet transfer? No    Vision:  Unable to read fine print.  Distance vision was intact.       Hearing: WFL    Communication: Able to verbalize. We may need to anticipate her needs    Cognition:  Impaired. Poor memory. Will need assistance    Socialization:  Enjoys socialization. Attended another adult day program 2x/week before they closed.     Interest in a structured exercise program established by a physical therapist? No    (Will need PT referral if  interested)      Leisure/Community Life:     What leisure activities or hobbies do you enjoy? Likes to participate in arts and crafts.     How often do you regularly go out into the community for leisure activities, social events? Once a month    Does anyone accompany you to these events? yes     If so, who? Staff at group home    What transportation system do you use for community events? [employer]    What role do your personal finances play in engaging in community activities? Currently on MA with limited funds.    When out in the community what types of events do you enjoy? She stated she likes to look at pictures.     What currently prevents you from going out into the community? Unable to express at this time    General Observations:  Flat affect.  Does not initiate conversation.  Pleasant when spoken to.  Body tremors noted.

## 2023-03-14 NOTE — PROGRESS NOTES
[facility] Social History    Full Name: Karen Oscar        MRN: [id number]  YOB: 1950  Nickname: Gemini      Sex: Female     Home Phone: [phone]      Cell Phone: [phone]  Address: [address] [place] [address]/Zip: [place] [address]     Transportation:[employer] [phone]  Language:Englishe         needed?NO       Race:       Country of Origin: [place]         Marital Status: Single                   ______________________________________________________________________________________     [place]?No    Branch of Service: NA      Education: Attended the  [employer]- 4 year degree    Job History: Worked in [employer]          Current living arrangement:  Home / Assisted Living / Group Home / Other: Assisted Living  Number of Children: NA    Other Important People/Pets: Nothing to report  What else should we know about you? She likes to be creative    Primary Care Provider: [facility]   Phone:  [phone] Fax: [phone]       1. Emergency Contacts:   2. Marge Angel      Relationship: sister    Phone: [phone]  3. Shanice Villarreal         Relationship:      Phone:[phone]

## 2023-03-16 ENCOUNTER — HOSPITAL ENCOUNTER (OUTPATIENT)
Dept: REHABILITATION | Facility: CLINIC | Age: 73
Discharge: HOME | End: 2023-03-16
Attending: PHYSICIAN ASSISTANT
Payer: MEDICAID

## 2023-03-16 PROCEDURE — S5102 ADULT DAY CARE PER DIEM: HCPCS

## 2023-03-21 NOTE — PROGRESS NOTES
[facility] RN Note    Previous documentation since 3/14/23 reviewed.  Participant not in facility at this time.

## 2023-03-23 ENCOUNTER — HOSPITAL ENCOUNTER (OUTPATIENT)
Dept: REHABILITATION | Facility: CLINIC | Age: 73
Discharge: HOME | End: 2023-03-23
Attending: PHYSICIAN ASSISTANT
Payer: MEDICAID

## 2023-03-23 PROCEDURE — S5102 ADULT DAY CARE PER DIEM: HCPCS

## 2023-03-23 NOTE — PROGRESS NOTES
INDIVIDUAL PLAN OF CARE   [facility]    Member Name: Karen Angel; YOB: 1950  MRN: [id number]  3/23/2023    Goals developed in collaboration with: member  Staff responsible for plan: Candida COLEMAN  1. Long Term Goal (concrete, measurable, and time specific outcomes): Member will maximize level of independence in by activly participating in cognitive and physical activities with staff and peers each day of attening the day program..  Target Date: March 2024  Bi-Annual Review:    2. Short Term Goal: (concrete, measurable, and time specific outcomes): Member will actively participate in aerobic exercises each day of program attendance.  Target Date: September 2023  Bi-Annual Review:    3. Short Term Goal: (concrete, measurable, and time specific outcomes): Member will process issues of loss and change related to MS function and explores new opportunities for community engagement.    Target Date: September 2023  Bi-Annual Review:

## 2023-03-23 NOTE — PROGRESS NOTES
Individual abuse prevention plan (IAPP)  [facility]     Assessment of Susceptibility to Abuse, Including Self Abuse, Neglect (Identification of characteristics, which make the individual susceptible to abuse, and how these characteristics cause the individual to be susceptible to abuse.)     Is the person susceptible to abuse in each area?  Sexual Abuse:   No  Referrals made when the person is susceptible to abuse outside the scope or control of this program (Identify the referral and date it occurred): No additional risk reduction means needed at this time    Physical Abuse:   No   Referrals made when the person is susceptible to abuse outside the scope or control of this program (Identify the referral and date it occurred): No additional risk reduction means needed at this time     Self-Abuse:   No   Referrals made when the person is susceptible to abuse outside the scope or control of this program (Identify the referral and date it occurred): No additional risk reduction means needed at this time     Financial  Exploitation  No   Referrals made when the person is susceptible to abuse outside the scope or control of this program (Identify the referral and date it occurred): No additional risk reduction means needed at this time     Is the program aware of this person committing a violent crime or act of physical aggression towards others?  No   Referrals made when the person is susceptible to abuse outside the scope or control of this program (Identify the referral and date it occurred): No additional risk reduction means needed at this time     INDIVIDUAL ABUSE PREVENTION PLAN-MEASURES TAKEN TO MINIMIZE RISK OF ABUSE   ADL:   Assist with toileting     Ambulation/Transfers/Wheelchairs:  Provide assistance prn for propelling wheelchair   Behavior:   Monitor client's agitation level and redirect when appropriate  Communication:  Encourage verbalization of needs/concerns  Observe body  language/gestures to assist in anticipating client's needs  Cognitive Issues:   Provider reminders due to confusion, forgetfulness     Diet:   Staff will prepare food to facilitate client to feed self  Exercise:   Encourage participation in wheelchair aerobics  Hearing:   Seat client near  during group activities  Isolation:   Encourage socialization due to isolation in home environment  Medical Monitoring:   Encourage rest period  Mental Health:   Motivate client to join in activities that are beneficial to client  Encourage client to express feelings  Offer emotional support  Encourage independent decision making  Provide activities in which client can be successful  Sensory:   Provide variety of sensory groups to maintain current cognitive level  Vision:   Ensure client is wearing glasses and clean prn  Other:       Developed in consultation with:  Client  The Program Abuse Prevention Plan/IAPP identifies the specific actions that minimize abuse to the [facility] participant.  Yes

## 2023-03-28 ENCOUNTER — HOSPITAL ENCOUNTER (OUTPATIENT)
Dept: REHABILITATION | Facility: CLINIC | Age: 73
Discharge: HOME | End: 2023-03-28
Attending: PHYSICIAN ASSISTANT
Payer: MEDICAID

## 2023-03-28 PROCEDURE — S5102 ADULT DAY CARE PER DIEM: HCPCS

## 2023-03-30 ENCOUNTER — HOSPITAL ENCOUNTER (OUTPATIENT)
Dept: REHABILITATION | Facility: CLINIC | Age: 73
Discharge: HOME | End: 2023-03-30
Attending: PHYSICIAN ASSISTANT
Payer: MEDICAID

## 2023-03-30 PROCEDURE — S5102 ADULT DAY CARE PER DIEM: HCPCS

## 2023-04-04 ENCOUNTER — HOSPITAL ENCOUNTER (OUTPATIENT)
Dept: REHABILITATION | Facility: CLINIC | Age: 73
Discharge: HOME | End: 2023-04-04
Attending: PHYSICIAN ASSISTANT
Payer: MEDICAID

## 2023-04-04 PROCEDURE — S5100 ADULT DAYCARE SERVICES 15MIN: HCPCS

## 2023-04-06 ENCOUNTER — HOSPITAL ENCOUNTER (OUTPATIENT)
Dept: REHABILITATION | Facility: CLINIC | Age: 73
Discharge: HOME | End: 2023-04-06
Attending: PHYSICIAN ASSISTANT
Payer: MEDICAID

## 2023-04-06 PROCEDURE — S5102 ADULT DAY CARE PER DIEM: HCPCS

## 2023-04-11 ENCOUNTER — HOSPITAL ENCOUNTER (OUTPATIENT)
Dept: REHABILITATION | Facility: CLINIC | Age: 73
Discharge: HOME | End: 2023-04-11
Attending: PHYSICIAN ASSISTANT
Payer: MEDICAID

## 2023-04-11 PROCEDURE — S5100 ADULT DAYCARE SERVICES 15MIN: HCPCS

## 2023-04-11 NOTE — PROGRESS NOTES
[facility] RN Note    Previous documentation since 3/21/23 reviewed.  No concerns reported by AC staff or member.

## 2023-04-13 ENCOUNTER — HOSPITAL ENCOUNTER (OUTPATIENT)
Dept: REHABILITATION | Facility: CLINIC | Age: 73
Discharge: HOME | End: 2023-04-13
Attending: PHYSICIAN ASSISTANT
Payer: MEDICAID

## 2023-04-13 PROCEDURE — S5100 ADULT DAYCARE SERVICES 15MIN: HCPCS

## 2023-04-13 NOTE — PROGRESS NOTES
"[facility] Note:  Home and Community Based Services Outing    Destination:  [facility]    Description of Outing:  Member was assisted outside by staff to the community garden.  While at the garden member listened to music, socialized, and participated in aerobics.  Staff members were present to instruct aerobics and monitor safety for all.    Member Response:  \"It's nice to be outside again!\"    "

## 2023-04-20 ENCOUNTER — HOSPITAL ENCOUNTER (OUTPATIENT)
Dept: REHABILITATION | Facility: CLINIC | Age: 73
Discharge: HOME | End: 2023-04-20
Attending: PHYSICIAN ASSISTANT
Payer: MEDICAID

## 2023-04-20 PROCEDURE — S5100 ADULT DAYCARE SERVICES 15MIN: HCPCS

## 2023-04-25 ENCOUNTER — HOSPITAL ENCOUNTER (OUTPATIENT)
Dept: REHABILITATION | Facility: CLINIC | Age: 73
Discharge: HOME | End: 2023-04-25
Attending: PHYSICIAN ASSISTANT
Payer: MEDICAID

## 2023-04-25 PROCEDURE — S5100 ADULT DAYCARE SERVICES 15MIN: HCPCS

## 2023-04-25 NOTE — PROGRESS NOTES
MONTHLY PROGRESS NOTE AND PARTICIPATION REPORT   [facility]     Karen Angel, 1950  Attendance: Please see Epic for attendance record.     Communication:   Does communicate   Hearing:   No impairment  Vision:   Glasses  Orientation/Cognition:   Oriented  Behavior:   No behavioral concerns  Self-Preservation Skills:   Capable of self-preservation  Eating:   Assist with set up  Ambulation Walking:   Non-ambulatory  Transferring:   Independent  Wheelchair:   Independent  Toileting:   Independent  Maintenance Program:     None  Living Arrangements:   Lives in assisted living facility  Spiritual Needs: Needs are being met through support group  Medication Assistance:   Independent  Participation Report:   Aerobics/Exercise  Support Group  Cognitive Stimulation  Fire Drill  Creative Arts/Crafts  Games  Socialization  Level of Participation:   To the best of their ability      Gemini seems to enjoy her time here.  She socializes with others and participates in brain and body group.  Gemini states that she is tired and also lays her head down on the table. She often needs encouragement to participate but her sense of humor has begun to show as she continues to attend the program.

## 2023-05-02 ENCOUNTER — HOSPITAL ENCOUNTER (OUTPATIENT)
Dept: REHABILITATION | Facility: CLINIC | Age: 73
Discharge: HOME | End: 2023-05-02
Attending: PHYSICIAN ASSISTANT
Payer: MEDICAID

## 2023-05-02 PROCEDURE — S5100 ADULT DAYCARE SERVICES 15MIN: HCPCS

## 2023-05-02 NOTE — PROGRESS NOTES
[facility] RN Note    Previous documentation since 4/11/23 reviewed.  No concerns reported by AC staff or member.

## 2023-05-04 ENCOUNTER — HOSPITAL ENCOUNTER (OUTPATIENT)
Dept: REHABILITATION | Facility: CLINIC | Age: 73
Discharge: HOME | End: 2023-05-04
Attending: PHYSICIAN ASSISTANT
Payer: MEDICAID

## 2023-05-04 PROCEDURE — S5102 ADULT DAY CARE PER DIEM: HCPCS

## 2023-05-16 ENCOUNTER — HOSPITAL ENCOUNTER (OUTPATIENT)
Dept: REHABILITATION | Facility: CLINIC | Age: 73
Discharge: HOME | End: 2023-05-16
Attending: PHYSICIAN ASSISTANT
Payer: MEDICAID

## 2023-05-16 PROCEDURE — S5100 ADULT DAYCARE SERVICES 15MIN: HCPCS

## 2023-05-18 ENCOUNTER — HOSPITAL ENCOUNTER (OUTPATIENT)
Dept: REHABILITATION | Facility: CLINIC | Age: 73
Discharge: HOME | End: 2023-05-18
Attending: PHYSICIAN ASSISTANT
Payer: MEDICAID

## 2023-05-18 PROCEDURE — S5102 ADULT DAY CARE PER DIEM: HCPCS

## 2023-05-23 ENCOUNTER — HOSPITAL ENCOUNTER (OUTPATIENT)
Dept: REHABILITATION | Facility: CLINIC | Age: 73
Discharge: HOME | End: 2023-05-23
Attending: PHYSICIAN ASSISTANT
Payer: MEDICAID

## 2023-05-23 PROCEDURE — S5100 ADULT DAYCARE SERVICES 15MIN: HCPCS

## 2023-05-25 ENCOUNTER — HOSPITAL ENCOUNTER (OUTPATIENT)
Dept: REHABILITATION | Facility: CLINIC | Age: 73
Discharge: HOME | End: 2023-05-25
Attending: PHYSICIAN ASSISTANT
Payer: MEDICAID

## 2023-05-25 PROCEDURE — S5100 ADULT DAYCARE SERVICES 15MIN: HCPCS

## 2023-05-30 ENCOUNTER — HOSPITAL ENCOUNTER (OUTPATIENT)
Dept: REHABILITATION | Facility: CLINIC | Age: 73
Discharge: HOME | End: 2023-05-30
Attending: PHYSICIAN ASSISTANT
Payer: MEDICAID

## 2023-05-30 PROCEDURE — S5100 ADULT DAYCARE SERVICES 15MIN: HCPCS

## 2023-05-31 NOTE — PROGRESS NOTES
MONTHLY PROGRESS NOTE AND PARTICIPATION REPORT   [facility]     Karen Angel, 1950  Attendance: Please see Epic for attendance record.     Communication:   Does communicate   Hearing:   No impairment  Vision:   Glasses  Orientation/Cognition:   Oriented  Behavior:   No behavioral concerns  Self-Preservation Skills:   Capable of self-preservation  Eating:   Assist with set up  Ambulation Walking:   Non-ambulatory  Transferring:   Independent  Wheelchair:   Independent  Toileting:   Independent  Maintenance Program:     None  Living Arrangements:   Lives in assisted living facility  Spiritual Needs: Needs are being met through support group  Medication Assistance:   Independent  Participation Report:   Aerobics/Exercise  Support Group  Cognitive Stimulation  Fire Drill  Creative Arts/Crafts  Games  Socialization  Level of Participation:   To the best of their ability      Gemini typically requires encouragement to participate in art, games, or other activities. When comfortable, Gemini makes jokes and shares personal stories with other members and staff. Gemini has started a ceramic vase in art class.

## 2023-06-01 ENCOUNTER — HOSPITAL ENCOUNTER (OUTPATIENT)
Dept: REHABILITATION | Facility: CLINIC | Age: 73
Discharge: HOME | End: 2023-06-01
Attending: PHYSICIAN ASSISTANT
Payer: MEDICAID

## 2023-06-01 PROCEDURE — S5100 ADULT DAYCARE SERVICES 15MIN: HCPCS

## 2023-06-01 NOTE — PROGRESS NOTES
[facility] RN Note    Previous documentation since 5/2/23 reviewed.  No concerns reported by AC staff or member.

## 2023-06-04 ENCOUNTER — HEALTH MAINTENANCE LETTER (OUTPATIENT)
Age: 73
End: 2023-06-04

## 2023-06-06 ENCOUNTER — HOSPITAL ENCOUNTER (OUTPATIENT)
Dept: REHABILITATION | Facility: CLINIC | Age: 73
Discharge: HOME | End: 2023-06-06
Attending: PHYSICIAN ASSISTANT
Payer: MEDICAID

## 2023-06-06 PROCEDURE — S5100 ADULT DAYCARE SERVICES 15MIN: HCPCS

## 2023-06-08 ENCOUNTER — HOSPITAL ENCOUNTER (OUTPATIENT)
Dept: REHABILITATION | Facility: CLINIC | Age: 73
Discharge: HOME | End: 2023-06-08
Attending: PHYSICIAN ASSISTANT
Payer: MEDICAID

## 2023-06-08 PROCEDURE — S5100 ADULT DAYCARE SERVICES 15MIN: HCPCS

## 2023-06-15 ENCOUNTER — HOSPITAL ENCOUNTER (OUTPATIENT)
Dept: REHABILITATION | Facility: CLINIC | Age: 73
Discharge: HOME | End: 2023-06-15
Attending: PHYSICIAN ASSISTANT
Payer: MEDICAID

## 2023-06-15 PROCEDURE — S5100 ADULT DAYCARE SERVICES 15MIN: HCPCS

## 2023-06-15 NOTE — PROGRESS NOTES
Individual abuse prevention plan (IAPP)  [facility]     Assessment of Susceptibility to Abuse, Including Self Abuse, Neglect (Identification of characteristics, which make the individual susceptible to abuse, and how these characteristics cause the individual to be susceptible to abuse.)     Is the person susceptible to abuse in each area?  Sexual Abuse:   No  Referrals made when the person is susceptible to abuse outside the scope or control of this program (Identify the referral and date it occurred): No additional risk reduction means needed at this time    Physical Abuse:   No   Referrals made when the person is susceptible to abuse outside the scope or control of this program (Identify the referral and date it occurred): No additional risk reduction means needed at this time     Self-Abuse:   No   Referrals made when the person is susceptible to abuse outside the scope or control of this program (Identify the referral and date it occurred): No additional risk reduction means needed at this time     Financial  Exploitation  No   Referrals made when the person is susceptible to abuse outside the scope or control of this program (Identify the referral and date it occurred): No additional risk reduction means needed at this time     Is the program aware of this person committing a violent crime or act of physical aggression towards others?  No   Referrals made when the person is susceptible to abuse outside the scope or control of this program (Identify the referral and date it occurred): No additional risk reduction means needed at this time     INDIVIDUAL ABUSE PREVENTION PLAN-MEASURES TAKEN TO MINIMIZE RISK OF ABUSE   ADL:   Assist with toileting     Ambulation/Transfers/Wheelchairs:  Provide assistance prn for propelling wheelchair   Behavior:   Monitor client's agitation level and redirect when appropriate  Communication:  Encourage verbalization of needs/concerns  Observe body  language/gestures to assist in anticipating client's needs  Cognitive Issues:   Provider reminders due to confusion, forgetfulness     Diet:   Staff will prepare food to facilitate client to feed self  Exercise:   Encourage participation in wheelchair aerobics  Hearing:   Seat client near  during group activities  Isolation:   Encourage socialization due to isolation in home environment  Medical Monitoring:   Encourage rest period  Mental Health:   Motivate client to join in activities that are beneficial to client  Encourage client to express feelings  Offer emotional support  Encourage independent decision making  Provide activities in which client can be successful  Sensory:   Provide variety of sensory groups to maintain current cognitive level  Vision:   Ensure client is wearing glasses and clean prn  Other:       Developed in consultation with:  Client  The Program Abuse Prevention Plan/IAPP identifies the specific actions that minimize abuse to the [facility] participant.  Yes

## 2023-06-15 NOTE — PROGRESS NOTES
INDIVIDUAL PLAN OF CARE   [facility]    Member Name: Karen Angel; YOB: 1950  MRN: [id number]  3/23/2023    Goals developed in collaboration with: member  Staff responsible for plan: Candida GARCIA/RICKEY, Pelon COLEMAN  1. Long Term Goal (concrete, measurable, and time specific outcomes): Member will maximize level of independence in by activly participating in cognitive and physical activities with staff and peers each day of attening the day program..  Target Date:  June 2024  Bi-Annual Review:  Goal remains appropriate  Gemini requires verbal cues from staff and other members for activity participation ~75% of the time.    2. Short Term Goal: (concrete, measurable, and time specific outcomes): Member will actively participate in aerobic exercises each day of program attendance.  Target Date:  December 2023  Bi-Annual Review:  Goal remains appropriate    3. Short Term Goal: (concrete, measurable, and time specific outcomes): Member will process issues of loss and change related to MS function and explores new opportunities for community engagement.    Target Date:  December 2023  Bi-Annual Review:  Goal remains appropriate.

## 2023-06-15 NOTE — PROGRESS NOTES
MONTHLY PROGRESS NOTE AND PARTICIPATION REPORT   [facility]    Karen Angel, 1950  Attendance: Please see Epic for attendance record.    Communication:   Does communicate   Hearing:   No impairment  Vision:   Glasses  Orientation/Cognition:   Partial disorientation  Behavior:   No behavioral concerns  Self-Preservation Skills:   Not capable of self-preservation  Eating:   Assist with set up  Gemini has an essential tremor in BL UE's which makes it difficult for her to drink from a cup so she uses a straw and keeps the cup on the table while drinking.  Ambulation Walking:   Non-ambulatory  Transferring:   Aide of one person/two  Wheelchair:   Needs help  Toileting:   Needs assistance  Maintenance Program:     None  Living Arrangements:   Lives in assisted living facility  Spiritual Needs: Needs are being met through support group  Medication Assistance:   Medication not taken during program hours  Participation Report:   Aerobics/Exercise  Support Group  Cognitive Stimulation  Fire Drill  Creative Arts/Crafts  Games  Gardening  Speakers/Entertainment  Socialization  Current Events/News  Education/Health Topic  Gemini requires v/c's to initiate and continue activity participation ~75% of the time.  Level of Participation:   To the best of their ability

## 2023-06-20 ENCOUNTER — HOSPITAL ENCOUNTER (OUTPATIENT)
Dept: REHABILITATION | Facility: CLINIC | Age: 73
Discharge: HOME | End: 2023-06-20
Attending: PHYSICIAN ASSISTANT
Payer: MEDICAID

## 2023-06-20 PROCEDURE — S5100 ADULT DAYCARE SERVICES 15MIN: HCPCS

## 2023-06-22 ENCOUNTER — HOSPITAL ENCOUNTER (OUTPATIENT)
Dept: REHABILITATION | Facility: CLINIC | Age: 73
Discharge: HOME | End: 2023-06-22
Attending: PHYSICIAN ASSISTANT
Payer: MEDICAID

## 2023-06-22 PROCEDURE — S5100 ADULT DAYCARE SERVICES 15MIN: HCPCS

## 2023-06-27 ENCOUNTER — HOSPITAL ENCOUNTER (OUTPATIENT)
Dept: REHABILITATION | Facility: CLINIC | Age: 73
Discharge: HOME | End: 2023-06-27
Attending: PHYSICIAN ASSISTANT
Payer: MEDICAID

## 2023-06-27 PROCEDURE — S5100 ADULT DAYCARE SERVICES 15MIN: HCPCS

## 2023-06-29 ENCOUNTER — HOSPITAL ENCOUNTER (OUTPATIENT)
Dept: REHABILITATION | Facility: CLINIC | Age: 73
Discharge: HOME | End: 2023-06-29
Attending: PHYSICIAN ASSISTANT
Payer: MEDICAID

## 2023-06-29 PROCEDURE — S5100 ADULT DAYCARE SERVICES 15MIN: HCPCS

## 2023-07-06 ENCOUNTER — HOSPITAL ENCOUNTER (OUTPATIENT)
Dept: REHABILITATION | Facility: CLINIC | Age: 73
Discharge: HOME | End: 2023-07-06
Attending: PHYSICIAN ASSISTANT
Payer: MEDICAID

## 2023-07-06 PROCEDURE — S5100 ADULT DAYCARE SERVICES 15MIN: HCPCS

## 2023-07-11 ENCOUNTER — HOSPITAL ENCOUNTER (OUTPATIENT)
Dept: REHABILITATION | Facility: CLINIC | Age: 73
Discharge: HOME | End: 2023-07-11
Attending: PHYSICIAN ASSISTANT
Payer: MEDICAID

## 2023-07-11 PROCEDURE — S5100 ADULT DAYCARE SERVICES 15MIN: HCPCS

## 2023-07-11 NOTE — PROGRESS NOTES
"[facility] Note:  Home and Community Based Services Outing     [facility]     Description of Outing:  Staff facilitated secure transfers and loading of [employer] bus and provided wheelchair functional mobility assistance and supervision through the [facility] for a 45 minute educational tour.  Members and staff observed and learned about assorted Raptor birds of prey.        Member Response:  \"I thought it was cool!\",  \"I liked seeing the owls and eagles.\"    "

## 2023-07-18 ENCOUNTER — HOSPITAL ENCOUNTER (OUTPATIENT)
Dept: REHABILITATION | Facility: CLINIC | Age: 73
Discharge: HOME | End: 2023-07-18
Attending: PHYSICIAN ASSISTANT
Payer: MEDICAID

## 2023-07-18 PROCEDURE — S5100 ADULT DAYCARE SERVICES 15MIN: HCPCS

## 2023-07-18 NOTE — PROGRESS NOTES
[facility] RN Note    Previous documentation since 6/1/23 reviewed.  No concerns reported by AC staff or member.

## 2023-07-27 ENCOUNTER — HOSPITAL ENCOUNTER (OUTPATIENT)
Dept: REHABILITATION | Facility: CLINIC | Age: 73
Discharge: HOME | End: 2023-07-27
Attending: PHYSICIAN ASSISTANT
Payer: MEDICAID

## 2023-07-27 PROCEDURE — S5100 ADULT DAYCARE SERVICES 15MIN: HCPCS

## 2023-07-27 NOTE — PROGRESS NOTES
MONTHLY PROGRESS NOTE AND PARTICIPATION REPORT   [facility]    Karen Angel, 1950  Attendance: Please see Epic for attendance record.    Communication:   Does communicate   Hearing:   No impairment  Vision:   Glasses  Orientation/Cognition:   Partial disorientation  Behavior:   No behavioral concerns  Self-Preservation Skills:   Not capable of self-preservation  Eating:   Assist with set up  Gemini has an essential tremor in BL UE's which makes it difficult for her to drink from a cup so she uses a straw and keeps the cup on the table while drinking.  Ambulation Walking:   Non-ambulatory  Transferring:   Aide of one person/two  Wheelchair:   Needs help  Toileting:   Needs assistance  Maintenance Program:     None  Living Arrangements:   Lives in assisted living facility  Spiritual Needs: Needs are being met through support group  Medication Assistance:   Medication not taken during program hours  Participation Report:   Aerobics/Exercise  Support Group  Cognitive Stimulation  Fire Drill  Creative Arts/Crafts  Games  Gardening  Speakers/Entertainment  Socialization  Current Events/News  Education/Health Topic  Gemini requires v/c's to initiate and continue activity participation ~75% of the time.  Level of Participation:   To the best of their ability    Gemini has been more social with the group in the past month. Gemini enjoys morning conversations. Gemini is working on a vase in the art room.

## 2023-08-01 ENCOUNTER — HOSPITAL ENCOUNTER (OUTPATIENT)
Dept: REHABILITATION | Facility: CLINIC | Age: 73
Discharge: HOME | End: 2023-08-01
Attending: PHYSICIAN ASSISTANT
Payer: MEDICAID

## 2023-08-01 PROCEDURE — S5100 ADULT DAYCARE SERVICES 15MIN: HCPCS

## 2023-08-03 ENCOUNTER — HOSPITAL ENCOUNTER (OUTPATIENT)
Dept: REHABILITATION | Facility: CLINIC | Age: 73
Discharge: HOME | End: 2023-08-03
Attending: PHYSICIAN ASSISTANT
Payer: MEDICAID

## 2023-08-03 PROCEDURE — S5100 ADULT DAYCARE SERVICES 15MIN: HCPCS

## 2023-08-10 ENCOUNTER — HOSPITAL ENCOUNTER (OUTPATIENT)
Dept: REHABILITATION | Facility: CLINIC | Age: 73
Discharge: HOME | End: 2023-08-10
Attending: PHYSICIAN ASSISTANT
Payer: MEDICAID

## 2023-08-10 PROCEDURE — S5100 ADULT DAYCARE SERVICES 15MIN: HCPCS

## 2023-08-16 ENCOUNTER — TRANSFERRED RECORDS (OUTPATIENT)
Dept: HEALTH INFORMATION MANAGEMENT | Facility: CLINIC | Age: 73
End: 2023-08-16

## 2023-08-22 ENCOUNTER — TELEPHONE (OUTPATIENT)
Dept: NEUROLOGY | Facility: CLINIC | Age: 73
End: 2023-08-22

## 2023-08-22 ENCOUNTER — OFFICE VISIT (OUTPATIENT)
Dept: NEUROLOGY | Facility: CLINIC | Age: 73
End: 2023-08-22
Attending: PSYCHIATRY & NEUROLOGY
Payer: COMMERCIAL

## 2023-08-22 VITALS — HEART RATE: 86 BPM | OXYGEN SATURATION: 97 % | DIASTOLIC BLOOD PRESSURE: 70 MMHG | SYSTOLIC BLOOD PRESSURE: 161 MMHG

## 2023-08-22 DIAGNOSIS — R41.4 HEMI-NEGLECT OF LEFT SIDE: ICD-10-CM

## 2023-08-22 DIAGNOSIS — I10 ESSENTIAL HYPERTENSION: Primary | ICD-10-CM

## 2023-08-22 DIAGNOSIS — G35 MS (MULTIPLE SCLEROSIS) (H): ICD-10-CM

## 2023-08-22 DIAGNOSIS — R25.1 TREMOR: ICD-10-CM

## 2023-08-22 DIAGNOSIS — F02.80 DEMENTIA DUE TO MEDICAL CONDITION WITHOUT BEHAVIORAL DISTURBANCE (H): ICD-10-CM

## 2023-08-22 PROCEDURE — G0463 HOSPITAL OUTPT CLINIC VISIT: HCPCS | Performed by: PSYCHIATRY & NEUROLOGY

## 2023-08-22 PROCEDURE — 99214 OFFICE O/P EST MOD 30 MIN: CPT | Performed by: PSYCHIATRY & NEUROLOGY

## 2023-08-22 RX ORDER — LOSARTAN POTASSIUM 50 MG/1
50 TABLET ORAL DAILY
Qty: 90 TABLET | Refills: 3 | Status: SHIPPED | OUTPATIENT
Start: 2023-08-22

## 2023-08-22 ASSESSMENT — PAIN SCALES - GENERAL: PAINLEVEL: NO PAIN (0)

## 2023-08-22 NOTE — TELEPHONE ENCOUNTER
Called pt's living facility, [facility] (phone [phone]) and discussed that Dr Covarrubias would like pt to have BP checked every 2 weeks, with results sent to us. Provided our fax number. I was informed that they currently check her BP at least BID, but often every 2 hours, due to elevated BP. They will fax BP report to us today and every 2 weeks going forward. Discussed that pt is starting losartan. Written orders not needed as long as rx is sent to [facility] pharmacy (which it was). Per the staff I spoke with today, no written orders are needed for the BP monitoring.     Thu Arce RN

## 2023-08-22 NOTE — LETTER
"8/22/2023       RE: Karen Angel  [address] [facility] [address]     Dear Colleague,    Thank you for referring your patient, Karen Angel, to the [facility] MULTIPLE SCLEROSIS CLINIC [place] [facility]. Please see a copy of my visit note below.    ID: Karen Angel is a 73-year-old woman who I have seen once previously 1 year ago for multiple sclerosis.  She returns for routine follow-up.    History of present illness: Karen has what is felt to be the primary progressive form of MS, with main symptoms being cognitive impairment, cerebellar tremor and gait impairment.  She also had a stroke in 2022 which left her with left hemineglect.  Her family member or aide who accompanies her today says her blood pressure has been persistently high.  She is on amlodipine 5 mg/day.  Blood pressure today is elevated at 161/70.  We discussed that, and I think adding a second antihypertensive agent is warranted.  She is no longer using her walker, just her wheelchair, which she says is because the people at her assisted living are \"lazy\".    Past Medical History:   Diagnosis Date    Abnormal brain MRI 9/30/2019    Narrative & Impression   MRI BRAIN WITHOUT AND WITH CONTRAST  9/30/2019 11:17 AM    HISTORY:  Neuro deficit(s), subacute. Tremor. Multiple sclerosis (H).   TECHNIQUE:  Multiplanar, multisequence MRI of the brain without and with 7 mL Gadavist.   COMPARISON: Outside MRI head 10/22/2018.   FINDINGS: There is no restricted diffusion to suggest acute infarct. Confluent areas of T2/T2 FLAIR signal abno    Dementia (H)     Migraine     Multiple sclerosis, primary progressive (H)    history of stroke      Current Outpatient Medications:     amLODIPine (NORVASC) 5 MG tablet, Take 5 mg by mouth daily, Disp: , Rfl:     citalopram (CELEXA) 20 MG tablet, Take 20 mg by mouth daily Take 1 tablet by mouth daily @@ 8am, Disp: , Rfl:     clonazePAM " (KLONOPIN) 0.5 MG tablet, Take 1 tablet (0.5 mg) by mouth 2 times daily as needed for anxiety, Disp: 60 tablet, Rfl: 3    FIBER LAXATIVE 0.52 g capsule, daily, Disp: , Rfl:     guaiFENesin (MUCINEX) 600 MG 12 hr tablet, Take 1 tablet twice a day as needed, Disp: , Rfl:     losartan (COZAAR) 50 MG tablet, Take 1 tablet (50 mg) by mouth daily, Disp: 90 tablet, Rfl: 3    memantine (NAMENDA) 10 MG tablet, Take 1 tablet (10 mg) by mouth 2 times daily Start with 5 mg BID x 1 month then increase to 10 mg in the AM and 5 in Pm x 1 month and then 10 mg BID, Disp: 60 tablet, Rfl: 3    rosuvastatin (CRESTOR) 40 MG tablet, Take 40 mg by mouth daily, Disp: , Rfl:     traZODone (DESYREL) 100 MG tablet, Take 1 tablet by mouth with food or snack @@ 8pm, Disp: , Rfl:     triamcinolone (KENALOG) 0.025 % external ointment, Apply topically 2 times daily, Disp: , Rfl:     vitamin D3 (CHOLECALCIFEROL) 2000 units (50 mcg) tablet, Take 1 tablet by mouth daily @@ 8am (Patient not taking: Reported on 8/16/2022), Disp: , Rfl:      Exam:  She is alert.  Language is fluent.  I did not do bedside cognitive testing but she has obvious impairments in attention and short-term memory.  She has left hemineglect to both visual and tactile stimuli, but this is difficult to test as she has a great deal of difficulty maintaining her visual focus straight ahead.  Muscle bulk and tone and strength were normal.  There is similar tremor of both hands on finger-nose-finger.  Lungs were clear.  I do not have her cane to walk today.    Impression: Primary progressive MS, stable on no immunotherapy.  History of stroke on aspirin and amlodipine but with ongoing hypertension.  I spent 30 minutes on her care today with her including chart review and face-to-face time.  We discussed the importance of lowering her blood pressure to reduce the risk of recurrent stroke.  We will add losartan.  She gets her primary care through her assisted living and they can titrate  the antihypertensives going forward, with a goal of systolic blood pressure less than 130.    Plan: Losartan 50 mg/day.  Follow-up in 1 year.          Again, thank you for allowing me to participate in the care of your patient.      Sincerely,    Elver Covarrubias MD

## 2023-08-22 NOTE — NURSING NOTE
Chief Complaint   Patient presents with    MS    RECHECK     Annual follow up      Vitals were taken and medications were reconciled.   Manoj Lopez, EMT  1:38 PM

## 2023-08-23 ENCOUNTER — DOCUMENTATION ONLY (OUTPATIENT)
Dept: NEUROLOGY | Facility: CLINIC | Age: 73
End: 2023-08-23
Payer: COMMERCIAL

## 2023-08-23 NOTE — PROGRESS NOTES
Vitals repot received from [facility], reports placed in Dr. Covarrubias's folder for review.   Manoj Lopez EMT 08/23/2023 9:00AM

## 2023-08-25 NOTE — PROGRESS NOTES
MONTHLY PROGRESS NOTE AND PARTICIPATION REPORT   [facility]    Karen Angel, 1950  Attendance: Please see Epic for attendance record.    Communication:   Does communicate   Hearing:   No impairment  Vision:   Glasses  Orientation/Cognition:   Partial disorientation  Behavior:   No behavioral concerns  Self-Preservation Skills:   Not capable of self-preservation  Eating:   Assist with set up  Gemini has an essential tremor in BL UE's which makes it difficult for her to drink from a cup so she uses a straw and keeps the cup on the table while drinking.  Ambulation Walking:   Non-ambulatory  Transferring:   Aide of one person/two  Wheelchair:   Needs help  Toileting:   Needs assistance  Maintenance Program:     None  Living Arrangements:   Lives in assisted living facility  Spiritual Needs: Needs are being met through support group  Medication Assistance:   Medication not taken during program hours  Participation Report:   Aerobics/Exercise  Support Group  Cognitive Stimulation  Fire Drill  Creative Arts/Crafts  Games  Gardening  Speakers/Entertainment  Socialization  Current Events/News  Education/Health Topic  Gemini requires v/c's to initiate and continue activity participation ~75% of the time.  Level of Participation:   To the best of their ability    Gemini has not attended Day Program regularly in the past month. When present, Gemini enjoys participating in group games. Gemini says that she enjoys time in the garden.

## 2023-08-28 PROBLEM — R41.4 HEMI-NEGLECT OF LEFT SIDE: Status: ACTIVE | Noted: 2023-08-28

## 2023-08-28 NOTE — PROGRESS NOTES
"ID: Karen Angel is a 73-year-old woman who I have seen once previously 1 year ago for multiple sclerosis.  She returns for routine follow-up.    History of present illness: Karen has what is felt to be the primary progressive form of MS, with main symptoms being cognitive impairment, cerebellar tremor and gait impairment.  She also had a stroke in 2022 which left her with left hemineglect.  Her family member or aide who accompanies her today says her blood pressure has been persistently high.  She is on amlodipine 5 mg/day.  Blood pressure today is elevated at 161/70.  We discussed that, and I think adding a second antihypertensive agent is warranted.  She is no longer using her walker, just her wheelchair, which she says is because the people at her assisted living are \"lazy\".    Past Medical History:   Diagnosis Date    Abnormal brain MRI 9/30/2019    Narrative & Impression   MRI BRAIN WITHOUT AND WITH CONTRAST  9/30/2019 11:17 AM    HISTORY:  Neuro deficit(s), subacute. Tremor. Multiple sclerosis (H).   TECHNIQUE:  Multiplanar, multisequence MRI of the brain without and with 7 mL Gadavist.   COMPARISON: Outside MRI head 10/22/2018.   FINDINGS: There is no restricted diffusion to suggest acute infarct. Confluent areas of T2/T2 FLAIR signal abno    Dementia (H)     Migraine     Multiple sclerosis, primary progressive (H)    history of stroke      Current Outpatient Medications:     amLODIPine (NORVASC) 5 MG tablet, Take 5 mg by mouth daily, Disp: , Rfl:     citalopram (CELEXA) 20 MG tablet, Take 20 mg by mouth daily Take 1 tablet by mouth daily @@ 8am, Disp: , Rfl:     clonazePAM (KLONOPIN) 0.5 MG tablet, Take 1 tablet (0.5 mg) by mouth 2 times daily as needed for anxiety, Disp: 60 tablet, Rfl: 3    FIBER LAXATIVE 0.52 g capsule, daily, Disp: , Rfl:     guaiFENesin (MUCINEX) 600 MG 12 hr tablet, Take 1 tablet twice a day as needed, Disp: , Rfl:     losartan (COZAAR) 50 MG tablet, Take 1 tablet (50 mg) by " mouth daily, Disp: 90 tablet, Rfl: 3    memantine (NAMENDA) 10 MG tablet, Take 1 tablet (10 mg) by mouth 2 times daily Start with 5 mg BID x 1 month then increase to 10 mg in the AM and 5 in Pm x 1 month and then 10 mg BID, Disp: 60 tablet, Rfl: 3    rosuvastatin (CRESTOR) 40 MG tablet, Take 40 mg by mouth daily, Disp: , Rfl:     traZODone (DESYREL) 100 MG tablet, Take 1 tablet by mouth with food or snack @@ 8pm, Disp: , Rfl:     triamcinolone (KENALOG) 0.025 % external ointment, Apply topically 2 times daily, Disp: , Rfl:     vitamin D3 (CHOLECALCIFEROL) 2000 units (50 mcg) tablet, Take 1 tablet by mouth daily @@ 8am (Patient not taking: Reported on 8/16/2022), Disp: , Rfl:      Exam:  She is alert.  Language is fluent.  I did not do bedside cognitive testing but she has obvious impairments in attention and short-term memory.  She has left hemineglect to both visual and tactile stimuli, but this is difficult to test as she has a great deal of difficulty maintaining her visual focus straight ahead.  Muscle bulk and tone and strength were normal.  There is cerebellar tremor of both hands on finger-nose-finger.  Lungs were clear.  I do not have her attempt to walk today.    Impression: Primary progressive MS, stable on no immunotherapy.  History of stroke on aspirin and amlodipine but with ongoing hypertension.  I spent 30 minutes on her care today with her including chart review and face-to-face time.  We discussed the importance of lowering her blood pressure to reduce the risk of recurrent stroke.  We will add losartan.  She gets her primary care through her assisted living and they can titrate the antihypertensives going forward, with a goal of systolic blood pressure less than 130.    Plan: Losartan 50 mg/day.  Follow-up in 1 year.

## 2023-09-05 ENCOUNTER — HOSPITAL ENCOUNTER (OUTPATIENT)
Dept: REHABILITATION | Facility: CLINIC | Age: 73
Discharge: HOME | End: 2023-09-05
Attending: PHYSICIAN ASSISTANT
Payer: MEDICAID

## 2023-09-05 PROCEDURE — S5102 ADULT DAY CARE PER DIEM: HCPCS | Performed by: OCCUPATIONAL THERAPIST

## 2023-09-05 NOTE — PROGRESS NOTES
[facility] RN Note    Previous documentation since 8/17/23 reviewed.  No concerns reported by AC staff or member.

## 2023-09-07 ENCOUNTER — HOSPITAL ENCOUNTER (OUTPATIENT)
Dept: REHABILITATION | Facility: CLINIC | Age: 73
Discharge: HOME | End: 2023-09-07
Attending: PHYSICIAN ASSISTANT
Payer: MEDICAID

## 2023-09-07 PROCEDURE — S5100 ADULT DAYCARE SERVICES 15MIN: HCPCS

## 2023-09-12 ENCOUNTER — HOSPITAL ENCOUNTER (OUTPATIENT)
Dept: REHABILITATION | Facility: CLINIC | Age: 73
Discharge: HOME | End: 2023-09-12
Attending: PHYSICIAN ASSISTANT
Payer: MEDICAID

## 2023-09-12 PROCEDURE — S5100 ADULT DAYCARE SERVICES 15MIN: HCPCS

## 2023-09-12 NOTE — TELEPHONE ENCOUNTER
We have not received additional BP reports since 8/23. Voicemail message left for the [facility], requesting BP to be checked at least every 2 weeks and reports faxed to us at [phone].     Thu Arce RN

## 2023-09-14 ENCOUNTER — HOSPITAL ENCOUNTER (OUTPATIENT)
Dept: REHABILITATION | Facility: CLINIC | Age: 73
Discharge: HOME | End: 2023-09-14
Attending: PHYSICIAN ASSISTANT
Payer: MEDICAID

## 2023-09-14 PROCEDURE — S5100 ADULT DAYCARE SERVICES 15MIN: HCPCS

## 2023-09-18 NOTE — PROGRESS NOTES
New Blood pressure reading from 08/15/2023 through 09/15/2023 have been received, Blood pressure readings have been placed in Dr. Covarrubias's folder for review.   Manoj Lopez EMT 09/18/2023 9:08AM

## 2023-09-19 ENCOUNTER — HOSPITAL ENCOUNTER (OUTPATIENT)
Dept: REHABILITATION | Facility: CLINIC | Age: 73
Discharge: HOME | End: 2023-09-19
Attending: PHYSICIAN ASSISTANT
Payer: MEDICAID

## 2023-09-19 PROCEDURE — S5100 ADULT DAYCARE SERVICES 15MIN: HCPCS

## 2023-09-26 ENCOUNTER — HOSPITAL ENCOUNTER (OUTPATIENT)
Dept: REHABILITATION | Facility: CLINIC | Age: 73
Discharge: HOME | End: 2023-09-26
Attending: PHYSICIAN ASSISTANT
Payer: MEDICAID

## 2023-09-26 PROCEDURE — S5100 ADULT DAYCARE SERVICES 15MIN: HCPCS

## 2023-09-28 ENCOUNTER — HOSPITAL ENCOUNTER (OUTPATIENT)
Dept: REHABILITATION | Facility: CLINIC | Age: 73
Discharge: HOME | End: 2023-09-28
Attending: PHYSICIAN ASSISTANT
Payer: MEDICAID

## 2023-09-28 PROCEDURE — S5100 ADULT DAYCARE SERVICES 15MIN: HCPCS

## 2023-10-03 ENCOUNTER — HOSPITAL ENCOUNTER (OUTPATIENT)
Dept: REHABILITATION | Facility: CLINIC | Age: 73
Discharge: HOME | End: 2023-10-03
Attending: PHYSICIAN ASSISTANT
Payer: MEDICAID

## 2023-10-03 PROCEDURE — S5100 ADULT DAYCARE SERVICES 15MIN: HCPCS

## 2023-10-04 ENCOUNTER — TELEPHONE (OUTPATIENT)
Dept: NEUROLOGY | Facility: CLINIC | Age: 73
End: 2023-10-04

## 2023-10-04 NOTE — TELEPHONE ENCOUNTER
Called [facility], asked for call back from nursing. Would like to continue to get BP report sent every 2 weeks to us in MS clinic (by fax to [phone]). We have received reports previously, but would like this to be automatically sent to us q 2 weeks without requiring phone call from us.    Thu Arce RN

## 2023-10-05 ENCOUNTER — HOSPITAL ENCOUNTER (OUTPATIENT)
Dept: REHABILITATION | Facility: CLINIC | Age: 73
Discharge: HOME | End: 2023-10-05
Attending: PHYSICIAN ASSISTANT
Payer: MEDICAID

## 2023-10-05 PROCEDURE — S5100 ADULT DAYCARE SERVICES 15MIN: HCPCS

## 2023-10-10 ENCOUNTER — HOSPITAL ENCOUNTER (OUTPATIENT)
Dept: REHABILITATION | Facility: CLINIC | Age: 73
Discharge: HOME | End: 2023-10-10
Attending: PHYSICIAN ASSISTANT
Payer: MEDICAID

## 2023-10-10 PROCEDURE — S5100 ADULT DAYCARE SERVICES 15MIN: HCPCS

## 2023-10-12 ENCOUNTER — HOSPITAL ENCOUNTER (OUTPATIENT)
Dept: REHABILITATION | Facility: CLINIC | Age: 73
Discharge: HOME | End: 2023-10-12
Attending: PHYSICIAN ASSISTANT
Payer: MEDICAID

## 2023-10-12 PROCEDURE — S5100 ADULT DAYCARE SERVICES 15MIN: HCPCS

## 2023-10-17 ENCOUNTER — HOSPITAL ENCOUNTER (OUTPATIENT)
Dept: REHABILITATION | Facility: CLINIC | Age: 73
Discharge: HOME | End: 2023-10-17
Attending: PHYSICIAN ASSISTANT
Payer: MEDICAID

## 2023-10-17 PROCEDURE — S5100 ADULT DAYCARE SERVICES 15MIN: HCPCS

## 2023-10-19 ENCOUNTER — HOSPITAL ENCOUNTER (OUTPATIENT)
Dept: REHABILITATION | Facility: CLINIC | Age: 73
Discharge: HOME | End: 2023-10-19
Attending: PHYSICIAN ASSISTANT
Payer: MEDICAID

## 2023-10-19 PROCEDURE — S5100 ADULT DAYCARE SERVICES 15MIN: HCPCS

## 2023-10-19 NOTE — PROGRESS NOTES
[facility] RN Note    Previous documentation since 9/5/23 reviewed.  No concerns reported by AC staff or member.

## 2023-10-24 ENCOUNTER — HOSPITAL ENCOUNTER (OUTPATIENT)
Dept: REHABILITATION | Facility: CLINIC | Age: 73
Discharge: HOME | End: 2023-10-24
Attending: PHYSICIAN ASSISTANT
Payer: MEDICAID

## 2023-10-24 PROCEDURE — S5102 ADULT DAY CARE PER DIEM: HCPCS

## 2023-10-24 NOTE — PROGRESS NOTES
MONTHLY PROGRESS NOTE AND PARTICIPATION REPORT   [facility]    Karen Angel, 1950  Attendance: Please see Epic for attendance record.    Communication:   Does communicate   Hearing:   No impairment  Vision:   Glasses  Orientation/Cognition:   Partial disorientation  Behavior:   No behavioral concerns  Self-Preservation Skills:   Not capable of self-preservation  Eating:   Assist with set up  Gemini has an essential tremor in BL UE's which makes it difficult for her to drink from a cup so she uses a straw and keeps the cup on the table while drinking.  Ambulation Walking:   Non-ambulatory  Transferring:   Aide of one person/two  Wheelchair:   Needs help  Toileting:   Needs assistance  Maintenance Program:     None  Living Arrangements:   Lives in assisted living facility  Spiritual Needs: Needs are being met through support group  Medication Assistance:   Medication not taken during program hours  Participation Report:   Aerobics/Exercise  Support Group  Cognitive Stimulation  Fire Drill  Creative Arts/Crafts  Games  Gardening  Speakers/Entertainment  Socialization  Current Events/News  Education/Health Topic  Gemini requires v/c's to initiate and continue activity participation ~75% of the time.  Level of Participation:   To the best of their ability    Karen has gotten more comfortable asking for things from staff. Karen has worked on a vase in the art room with encouragement from staff. Karen enjoys participating in group conversations.

## 2023-10-25 NOTE — TELEPHONE ENCOUNTER
Additional BP reports not received. Voicemail message left for [facility], asked again for BP to be checked every 2 weeks and report faxed to us at [phone] every 2 weeks on an ongoing basis. Provided call back number for questions.    Thu Arce RN

## 2023-10-26 ENCOUNTER — HOSPITAL ENCOUNTER (OUTPATIENT)
Dept: REHABILITATION | Facility: CLINIC | Age: 73
Discharge: HOME | End: 2023-10-26
Attending: PHYSICIAN ASSISTANT
Payer: MEDICAID

## 2023-10-26 PROCEDURE — S5100 ADULT DAYCARE SERVICES 15MIN: HCPCS

## 2023-11-02 ENCOUNTER — HOSPITAL ENCOUNTER (OUTPATIENT)
Dept: REHABILITATION | Facility: CLINIC | Age: 73
Discharge: HOME | End: 2023-11-02
Attending: PHYSICIAN ASSISTANT
Payer: MEDICAID

## 2023-11-02 PROCEDURE — S5100 ADULT DAYCARE SERVICES 15MIN: HCPCS

## 2023-11-02 NOTE — PROGRESS NOTES
[facility] RN Note    Previous documentation since 10/19/23 reviewed.  No concerns reported by AC staff or member.

## 2023-11-07 ENCOUNTER — HOSPITAL ENCOUNTER (OUTPATIENT)
Dept: REHABILITATION | Facility: CLINIC | Age: 73
Discharge: HOME | End: 2023-11-07
Attending: PHYSICIAN ASSISTANT
Payer: MEDICAID

## 2023-11-07 PROCEDURE — S5100 ADULT DAYCARE SERVICES 15MIN: HCPCS

## 2023-11-09 ENCOUNTER — HOSPITAL ENCOUNTER (OUTPATIENT)
Dept: REHABILITATION | Facility: CLINIC | Age: 73
Discharge: HOME | End: 2023-11-09
Attending: PHYSICIAN ASSISTANT
Payer: MEDICAID

## 2023-11-09 PROCEDURE — S5100 ADULT DAYCARE SERVICES 15MIN: HCPCS

## 2023-11-14 ENCOUNTER — HOSPITAL ENCOUNTER (OUTPATIENT)
Dept: REHABILITATION | Facility: CLINIC | Age: 73
Discharge: HOME | End: 2023-11-14
Attending: PHYSICIAN ASSISTANT
Payer: MEDICAID

## 2023-11-14 PROCEDURE — S5100 ADULT DAYCARE SERVICES 15MIN: HCPCS

## 2023-11-16 ENCOUNTER — HOSPITAL ENCOUNTER (OUTPATIENT)
Dept: REHABILITATION | Facility: CLINIC | Age: 73
Discharge: HOME | End: 2023-11-16
Attending: PHYSICIAN ASSISTANT
Payer: MEDICAID

## 2023-11-16 PROCEDURE — S5100 ADULT DAYCARE SERVICES 15MIN: HCPCS

## 2023-11-21 ENCOUNTER — HOSPITAL ENCOUNTER (OUTPATIENT)
Dept: REHABILITATION | Facility: CLINIC | Age: 73
Discharge: HOME | End: 2023-11-21
Attending: PHYSICIAN ASSISTANT
Payer: MEDICAID

## 2023-11-21 PROCEDURE — S5100 ADULT DAYCARE SERVICES 15MIN: HCPCS

## 2023-11-21 NOTE — PROGRESS NOTES
MONTHLY PROGRESS NOTE AND PARTICIPATION REPORT   [facility]    Karen Angel, 1950  Attendance: Please see Epic for attendance record.    Communication:   Does communicate   Hearing:   No impairment  Vision:   Glasses  Orientation/Cognition:   Partial disorientation  Behavior:   No behavioral concerns  Self-Preservation Skills:   Not capable of self-preservation  Eating:   Assist with set up  Gemini has an essential tremor in BL UE's which makes it difficult for her to drink from a cup so she uses a straw and keeps the cup on the table while drinking.  Ambulation Walking:   Non-ambulatory  Transferring:   Aide of one person/two  Wheelchair:   Needs help  Toileting:   Needs assistance  Maintenance Program:     None  Living Arrangements:   Lives in assisted living facility  Spiritual Needs: Needs are being met through support group  Medication Assistance:   Medication not taken during program hours  Participation Report:   Aerobics/Exercise  Support Group  Cognitive Stimulation  Fire Drill  Creative Arts/Crafts  Games  Gardening  Speakers/Entertainment  Socialization  Current Events/News  Education/Health Topic  Gemini requires v/c's to initiate and continue activity participation ~75% of the time.  Level of Participation:   To the best of their ability    Karen has worked on coloring pictures rather than ceramic painting in art this month. Karen seems more fatigued than in past months, but still participates with encouragement from staff. Karen enjoys listening to and commenting on lunch conversations.

## 2023-11-28 ENCOUNTER — HOSPITAL ENCOUNTER (OUTPATIENT)
Dept: REHABILITATION | Facility: CLINIC | Age: 73
Discharge: HOME | End: 2023-11-28
Attending: PHYSICIAN ASSISTANT
Payer: MEDICAID

## 2023-11-28 PROCEDURE — S5100 ADULT DAYCARE SERVICES 15MIN: HCPCS

## 2023-11-30 ENCOUNTER — HOSPITAL ENCOUNTER (OUTPATIENT)
Dept: REHABILITATION | Facility: CLINIC | Age: 73
Discharge: HOME | End: 2023-11-30
Attending: PHYSICIAN ASSISTANT
Payer: MEDICAID

## 2023-11-30 PROCEDURE — S5102 ADULT DAY CARE PER DIEM: HCPCS

## 2023-11-30 PROCEDURE — S5100 ADULT DAYCARE SERVICES 15MIN: HCPCS

## 2023-12-07 ENCOUNTER — HOSPITAL ENCOUNTER (OUTPATIENT)
Dept: REHABILITATION | Facility: CLINIC | Age: 73
Discharge: HOME | End: 2023-12-07
Attending: PHYSICIAN ASSISTANT
Payer: MEDICAID

## 2023-12-07 PROCEDURE — S5100 ADULT DAYCARE SERVICES 15MIN: HCPCS

## 2023-12-12 ENCOUNTER — HOSPITAL ENCOUNTER (OUTPATIENT)
Dept: REHABILITATION | Facility: CLINIC | Age: 73
Discharge: HOME | End: 2023-12-12
Attending: PHYSICIAN ASSISTANT
Payer: MEDICAID

## 2023-12-12 PROCEDURE — S5100 ADULT DAYCARE SERVICES 15MIN: HCPCS

## 2023-12-14 ENCOUNTER — HOSPITAL ENCOUNTER (OUTPATIENT)
Dept: REHABILITATION | Facility: CLINIC | Age: 73
Discharge: HOME | End: 2023-12-14
Attending: PHYSICIAN ASSISTANT
Payer: MEDICAID

## 2023-12-14 PROCEDURE — S5100 ADULT DAYCARE SERVICES 15MIN: HCPCS

## 2023-12-21 ENCOUNTER — HOSPITAL ENCOUNTER (OUTPATIENT)
Dept: REHABILITATION | Facility: CLINIC | Age: 73
Discharge: HOME | End: 2023-12-21
Attending: PHYSICIAN ASSISTANT
Payer: MEDICAID

## 2023-12-21 PROCEDURE — S5100 ADULT DAYCARE SERVICES 15MIN: HCPCS

## 2023-12-28 ENCOUNTER — HOSPITAL ENCOUNTER (OUTPATIENT)
Dept: REHABILITATION | Facility: CLINIC | Age: 73
Discharge: HOME | End: 2023-12-28
Attending: PHYSICIAN ASSISTANT
Payer: MEDICAID

## 2023-12-28 PROCEDURE — S5102 ADULT DAY CARE PER DIEM: HCPCS

## 2024-01-09 ENCOUNTER — HOSPITAL ENCOUNTER (OUTPATIENT)
Dept: REHABILITATION | Facility: CLINIC | Age: 74
Discharge: HOME | End: 2024-01-09
Attending: PSYCHIATRY & NEUROLOGY
Payer: MEDICAID

## 2024-01-09 PROCEDURE — S5100 ADULT DAYCARE SERVICES 15MIN: HCPCS

## 2024-01-09 NOTE — PROGRESS NOTES
[facility] RN Note    Previous documentation since 12/7/23 reviewed.  No concerns reported by AC staff or member.

## 2024-01-11 ENCOUNTER — HOSPITAL ENCOUNTER (OUTPATIENT)
Dept: REHABILITATION | Facility: CLINIC | Age: 74
Discharge: HOME | End: 2024-01-11
Attending: PSYCHIATRY & NEUROLOGY
Payer: MEDICAID

## 2024-01-11 PROCEDURE — S5100 ADULT DAYCARE SERVICES 15MIN: HCPCS

## 2024-01-16 ENCOUNTER — HOSPITAL ENCOUNTER (OUTPATIENT)
Dept: REHABILITATION | Facility: CLINIC | Age: 74
Discharge: HOME | End: 2024-01-16
Attending: PSYCHIATRY & NEUROLOGY
Payer: MEDICAID

## 2024-01-16 PROCEDURE — S5100 ADULT DAYCARE SERVICES 15MIN: HCPCS

## 2024-01-18 ENCOUNTER — HOSPITAL ENCOUNTER (OUTPATIENT)
Dept: REHABILITATION | Facility: CLINIC | Age: 74
Discharge: HOME | End: 2024-01-18
Attending: PSYCHIATRY & NEUROLOGY
Payer: MEDICAID

## 2024-01-18 PROCEDURE — S5100 ADULT DAYCARE SERVICES 15MIN: HCPCS

## 2024-01-23 ENCOUNTER — HOSPITAL ENCOUNTER (OUTPATIENT)
Dept: REHABILITATION | Facility: CLINIC | Age: 74
Discharge: HOME | End: 2024-01-23
Attending: PSYCHIATRY & NEUROLOGY
Payer: MEDICAID

## 2024-01-23 PROCEDURE — S5100 ADULT DAYCARE SERVICES 15MIN: HCPCS

## 2024-01-25 ENCOUNTER — HOSPITAL ENCOUNTER (OUTPATIENT)
Dept: REHABILITATION | Facility: CLINIC | Age: 74
Discharge: HOME | End: 2024-01-25
Attending: PSYCHIATRY & NEUROLOGY
Payer: MEDICAID

## 2024-01-25 PROCEDURE — S5100 ADULT DAYCARE SERVICES 15MIN: HCPCS

## 2024-01-29 ENCOUNTER — MEDICAL CORRESPONDENCE (OUTPATIENT)
Dept: HEALTH INFORMATION MANAGEMENT | Facility: CLINIC | Age: 74
End: 2024-01-29
Payer: COMMERCIAL

## 2024-01-30 ENCOUNTER — HOSPITAL ENCOUNTER (OUTPATIENT)
Dept: REHABILITATION | Facility: CLINIC | Age: 74
Discharge: HOME | End: 2024-01-30
Attending: PSYCHIATRY & NEUROLOGY
Payer: MEDICAID

## 2024-01-30 ENCOUNTER — DOCUMENTATION ONLY (OUTPATIENT)
Dept: NEUROLOGY | Facility: CLINIC | Age: 74
End: 2024-01-30
Payer: COMMERCIAL

## 2024-01-30 PROCEDURE — S5102 ADULT DAY CARE PER DIEM: HCPCS

## 2024-01-30 NOTE — PROGRESS NOTES
Vital signs received from [facility], report placed in Dr. Covarrubias's folder for review and signature.   Manoj HOBBS 01/30/2024 8:15AM

## 2024-01-30 NOTE — PROGRESS NOTES
MONTHLY PROGRESS NOTE AND PARTICIPATION REPORT   [facility]    Karen Angel, 1950  Attendance: Please see Epic for attendance record.    Communication:   Does communicate   Hearing:   No impairment  Vision:   Glasses  Orientation/Cognition:   Partial disorientation  Behavior:   No behavioral concerns  Self-Preservation Skills:   Not capable of self-preservation  Eating:   Assist with set up  Gemini has an essential tremor in BL UE's which makes it difficult for her to drink from a cup so she uses a straw and keeps the cup on the table while drinking.  Ambulation Walking:   Non-ambulatory  Transferring:   Aide of one person/two  Wheelchair:   Needs help  Toileting:   Needs assistance  Maintenance Program:     None  Living Arrangements:   Lives in assisted living facility  Spiritual Needs: Needs are being met through support group  Medication Assistance:   Medication not taken during program hours  Participation Report:   Aerobics/Exercise  Support Group  Cognitive Stimulation  Fire Drill  Creative Arts/Crafts  Games  Gardening  Speakers/Entertainment  Socialization  Current Events/News  Education/Health Topic  Gemini requires v/c's to initiate and continue activity participation ~75% of the time.  Level of Participation:   To the best of their ability    Karen has lighthearted comments to contribute to conversations throughout the day. Karen tends to disengage from activities, but is easily drawn back in with some encouragement. Karen enjoys group games for brain and body time.

## 2024-02-01 ENCOUNTER — HOSPITAL ENCOUNTER (OUTPATIENT)
Dept: REHABILITATION | Facility: CLINIC | Age: 74
Discharge: HOME | End: 2024-02-01
Attending: PSYCHIATRY & NEUROLOGY
Payer: MEDICAID

## 2024-02-01 PROCEDURE — S5100 ADULT DAYCARE SERVICES 15MIN: HCPCS

## 2024-02-02 ENCOUNTER — TELEPHONE (OUTPATIENT)
Dept: NEUROLOGY | Facility: CLINIC | Age: 74
End: 2024-02-02
Payer: COMMERCIAL

## 2024-02-02 NOTE — TELEPHONE ENCOUNTER
Per Dr Covarrubias, additional blood pressure reports not needed from the [facility] as blood pressures are looking good. Spoke with staff at the [facility] (phone [phone]) and provided this information. Staff requested that I contact the director of nursing at phone [phone] with this information. Voicemail left. Also faxed ok to stop sending BP reports to fax [phone].    Thu Arce RN

## 2024-02-06 ENCOUNTER — HOSPITAL ENCOUNTER (OUTPATIENT)
Dept: REHABILITATION | Facility: CLINIC | Age: 74
Discharge: HOME | End: 2024-02-06
Attending: PSYCHIATRY & NEUROLOGY
Payer: MEDICAID

## 2024-02-06 PROCEDURE — S5100 ADULT DAYCARE SERVICES 15MIN: HCPCS

## 2024-02-08 ENCOUNTER — HOSPITAL ENCOUNTER (OUTPATIENT)
Dept: REHABILITATION | Facility: CLINIC | Age: 74
Discharge: HOME | End: 2024-02-08
Attending: PSYCHIATRY & NEUROLOGY
Payer: MEDICAID

## 2024-02-08 PROCEDURE — S5100 ADULT DAYCARE SERVICES 15MIN: HCPCS

## 2024-02-15 ENCOUNTER — HOSPITAL ENCOUNTER (OUTPATIENT)
Dept: REHABILITATION | Facility: CLINIC | Age: 74
Discharge: HOME | End: 2024-02-15
Attending: PSYCHIATRY & NEUROLOGY
Payer: MEDICAID

## 2024-02-15 PROCEDURE — S5100 ADULT DAYCARE SERVICES 15MIN: HCPCS

## 2024-02-22 ENCOUNTER — HOSPITAL ENCOUNTER (OUTPATIENT)
Dept: REHABILITATION | Facility: CLINIC | Age: 74
Discharge: HOME | End: 2024-02-22
Attending: PSYCHIATRY & NEUROLOGY
Payer: MEDICAID

## 2024-02-22 PROCEDURE — S5100 ADULT DAYCARE SERVICES 15MIN: HCPCS

## 2024-02-26 NOTE — PROGRESS NOTES
MONTHLY PROGRESS NOTE AND PARTICIPATION REPORT   [facility]    Karen Angel, 1950  Attendance: Please see Epic for attendance record.    Communication:   Does communicate   Hearing:   No impairment  Vision:   Glasses  Orientation/Cognition:   Partial disorientation  Behavior:   No behavioral concerns  Self-Preservation Skills:   Not capable of self-preservation  Eating:   Assist with set up  Gemini has an essential tremor in BL UE's which makes it difficult for her to drink from a cup so she uses a straw and keeps the cup on the table while drinking.  Ambulation Walking:   Non-ambulatory  Transferring:   Aide of one person/two  Wheelchair:   Needs help  Toileting:   Needs assistance  Maintenance Program:     None  Living Arrangements:   Lives in assisted living facility  Spiritual Needs: Needs are being met through support group  Medication Assistance:   Medication not taken during program hours  Participation Report:   Aerobics/Exercise  Support Group  Cognitive Stimulation  Fire Drill  Creative Arts/Crafts  Games  Gardening  Speakers/Entertainment  Socialization  Current Events/News  Education/Health Topic  Gemini requires v/c's to initiate and continue activity participation ~75% of the time.  Level of Participation:   To the best of their ability    Karen participates in group activities when directly encouraged. Karen enjoys card games and music discussions. Karen continues to work on painting a vase in the art room.

## 2024-02-27 ENCOUNTER — HOSPITAL ENCOUNTER (OUTPATIENT)
Dept: REHABILITATION | Facility: CLINIC | Age: 74
Discharge: HOME | End: 2024-02-27
Attending: PSYCHIATRY & NEUROLOGY
Payer: MEDICAID

## 2024-02-27 PROCEDURE — S5100 ADULT DAYCARE SERVICES 15MIN: HCPCS

## 2024-02-29 ENCOUNTER — HOSPITAL ENCOUNTER (OUTPATIENT)
Dept: REHABILITATION | Facility: CLINIC | Age: 74
Discharge: HOME | End: 2024-02-29
Attending: PSYCHIATRY & NEUROLOGY
Payer: MEDICAID

## 2024-02-29 PROCEDURE — S5100 ADULT DAYCARE SERVICES 15MIN: HCPCS

## 2024-03-05 NOTE — PROGRESS NOTES
[facility] RN Note    Previous documentation since 2/8/24 reviewed. Participant not in facility at this time.

## 2024-03-07 ENCOUNTER — HOSPITAL ENCOUNTER (OUTPATIENT)
Dept: REHABILITATION | Facility: CLINIC | Age: 74
Discharge: HOME | End: 2024-03-07
Attending: PSYCHIATRY & NEUROLOGY
Payer: MEDICAID

## 2024-03-07 PROCEDURE — S5100 ADULT DAYCARE SERVICES 15MIN: HCPCS

## 2024-03-14 ENCOUNTER — HOSPITAL ENCOUNTER (OUTPATIENT)
Dept: REHABILITATION | Facility: CLINIC | Age: 74
Discharge: HOME | End: 2024-03-14
Attending: PSYCHIATRY & NEUROLOGY
Payer: MEDICAID

## 2024-03-14 PROCEDURE — S5102 ADULT DAY CARE PER DIEM: HCPCS

## 2024-03-19 ENCOUNTER — HOSPITAL ENCOUNTER (OUTPATIENT)
Dept: REHABILITATION | Facility: CLINIC | Age: 74
Discharge: HOME | End: 2024-03-19
Attending: PSYCHIATRY & NEUROLOGY
Payer: MEDICAID

## 2024-03-19 PROCEDURE — S5100 ADULT DAYCARE SERVICES 15MIN: HCPCS

## 2024-03-21 ENCOUNTER — HOSPITAL ENCOUNTER (OUTPATIENT)
Dept: REHABILITATION | Facility: CLINIC | Age: 74
Discharge: HOME | End: 2024-03-21
Attending: PSYCHIATRY & NEUROLOGY
Payer: MEDICAID

## 2024-03-21 PROCEDURE — S5100 ADULT DAYCARE SERVICES 15MIN: HCPCS

## 2024-03-26 ENCOUNTER — HOSPITAL ENCOUNTER (OUTPATIENT)
Dept: REHABILITATION | Facility: CLINIC | Age: 74
Discharge: HOME | End: 2024-03-26
Attending: PSYCHIATRY & NEUROLOGY
Payer: MEDICAID

## 2024-03-26 PROCEDURE — S5100 ADULT DAYCARE SERVICES 15MIN: HCPCS

## 2024-03-28 ENCOUNTER — HOSPITAL ENCOUNTER (OUTPATIENT)
Dept: REHABILITATION | Facility: CLINIC | Age: 74
Discharge: HOME | End: 2024-03-28
Attending: PSYCHIATRY & NEUROLOGY
Payer: MEDICAID

## 2024-03-28 PROCEDURE — S5100 ADULT DAYCARE SERVICES 15MIN: HCPCS

## 2024-03-28 NOTE — PROGRESS NOTES
Individual abuse prevention plan (IAPP)  [facility]     Assessment of Susceptibility to Abuse, Including Self Abuse, Neglect (Identification of characteristics, which make the individual susceptible to abuse, and how these characteristics cause the individual to be susceptible to abuse.)     Is the person susceptible to abuse in each area?  Sexual Abuse:   No  Referrals made when the person is susceptible to abuse outside the scope or control of this program (Identify the referral and date it occurred): No additional risk reduction means needed at this time    Physical Abuse:   No   Referrals made when the person is susceptible to abuse outside the scope or control of this program (Identify the referral and date it occurred): No additional risk reduction means needed at this time     Self-Abuse:   No   Referrals made when the person is susceptible to abuse outside the scope or control of this program (Identify the referral and date it occurred): No additional risk reduction means needed at this time     Financial  Exploitation  No   Referrals made when the person is susceptible to abuse outside the scope or control of this program (Identify the referral and date it occurred): No additional risk reduction means needed at this time     Is the program aware of this person committing a violent crime or act of physical aggression towards others?  No   Referrals made when the person is susceptible to abuse outside the scope or control of this program (Identify the referral and date it occurred): No additional risk reduction means needed at this time     INDIVIDUAL ABUSE PREVENTION PLAN-MEASURES TAKEN TO MINIMIZE RISK OF ABUSE   ADL:   Assist with clothing management  Assist with toileting     Ambulation/Transfers/Wheelchairs:  Provide assistance prn for propelling wheelchair   Behavior:   Monitor client's agitation level and redirect when appropriate  Communication:  Encourage verbalization of  Name: Armani Richardson       Employer Group:   None     Insurance Information:        Referral Given: No   Medication adherence:  Yes   Goals/BARRIERS: 1. See Al dozier other week.-Met. Pt still seeing her every other week.  2. See Marquez Lomeli needs/concerns  Observe body language/gestures to assist in anticipating client's needs  Cognitive Issues:   Provider reminders due to confusion, forgetfulness     Diet:   Staff will prepare food to facilitate client to feed self  Exercise:   Encourage participation in wheelchair aerobics  Hearing:   Seat client near  during group activities  Isolation:   Encourage socialization due to isolation in home environment  Medical Monitoring:   Encourage rest period  Mental Health:   Motivate client to join in activities that are beneficial to client  Encourage client to express feelings  Offer emotional support  Encourage independent decision making  Provide activities in which client can be successful  Sensory:   Provide variety of sensory groups to maintain current cognitive level  Vision:   Provide verbal cues  Encourage client to position self to front of groups to maximize visual ability  Other:       Developed in consultation with:  Client  The Program Abuse Prevention Plan/IAPP identifies the specific actions that minimize abuse to the [facility] participant.  Yes

## 2024-03-28 NOTE — PROGRESS NOTES
MONTHLY PROGRESS NOTE AND PARTICIPATION REPORT   [facility]    Karen Angel, 1950  Attendance: Please see Epic for attendance record.    Communication:   Does communicate   Hearing:   No impairment  Vision:   Glasses  Orientation/Cognition:   Partial disorientation  Behavior:   No behavioral concerns  Self-Preservation Skills:   Not capable of self-preservation  Eating:   Assist with set up  Gemini has an essential tremor in BL UE's which makes it difficult for her to drink from a cup so she uses a straw and keeps the cup on the table while drinking.  Ambulation Walking:   Non-ambulatory  Transferring:   Aide of one person/two  Wheelchair:   Needs help  Toileting:   Needs assistance  Maintenance Program:     None  Living Arrangements:   Lives in assisted living facility  Spiritual Needs: Needs are being met through support group  Medication Assistance:   Medication not taken during program hours  Participation Report:   Aerobics/Exercise  Support Group  Cognitive Stimulation  Fire Drill  Creative Arts/Crafts  Games  Gardening  Speakers/Entertainment  Socialization  Current Events/News  Education/Health Topic  eGmini requires v/c's to initiate and continue activity participation ~75% of the time.  Level of Participation:   To the best of their ability    Gemini continues to seem fatigued when at the day center as she rests her head on the table almost any time she is able to.  Gemini has been working on a ceramic art project in art and socializes with peers.

## 2024-03-28 NOTE — PROGRESS NOTES
JOSE/L assisted pt in bathroom today.  GARCIA/L noticed very strong odor from pt's urin.  GARCIA/L called pt's house to let them know concern GARCIA/L had about pt's urin smell.

## 2024-03-28 NOTE — PROGRESS NOTES
INDIVIDUAL PLAN OF CARE   [facility]    Member Name: Karen Angel; YOB: 1950  MRN: [id number]  10/27/23    Goals developed in collaboration with: member  Staff responsible for plan: Candida GARCIA/RICKEY, Pelon COLEMAN  1. Long Term Goal (concrete, measurable, and time specific outcomes): Member will maximize level of independence in by activly participating in cognitive and physical activities with staff and peers each day of attening the day program..  Target Date:  March 2025  Bi-Annual Review:  Goal remains appropriate  Gemini requires verbal cues from staff and other members for activity participation ~75% of the time.    2. Short Term Goal: (concrete, measurable, and time specific outcomes):   Member will actively participate in Brain and Body cognitive activities, with staff set up and verbal cues to participate provided, each day of program attendance.  Target Date:  September 2024  Bi-Annual Review:  Goal remains appropriate    3. Short Term Goal: (concrete, measurable, and time specific outcomes): Member will actively participate in aerobic exercises each day of program attendance.  Verbal cues to participate provided by staff.  Target Date:  September 2024  Bi-Annual Review:  Goal remains appropriate.

## 2024-03-28 NOTE — PROGRESS NOTES
[facility] Social History    Full Name: Karen Angel       MRN: [id number]  YOB: 1950  Nickname: Gemini      Sex: Female     Home Phone: [phone]      Cell Phone: [phone]  Address: [address] [place], [place] [address]Zip: [place] [address]     Transportation:[employer] [phone]  Language:English        needed? NO       Race:       Country of Origin: [place]         Marital Status: Single                   ______________________________________________________________________________________     [place]? No    Branch of Service: NA      Education: Attended the  [employer]- 4 year degree    Job History: Worked in "[employer]"          Current living arrangement:  Home / Assisted Living / Group Home / Other: Assisted Living  Number of Children: NA    Other Important People/Pets: No  What else should we know about you? She likes to be called Isabel    Primary Care Provider: [facility]   Phone:  [phone] Fax: [phone]       Emergency Contacts:   Marge Angel      Relationship: sister    Phone: [phone]  Shanice Villarreal         Relationship:      Phone:[phone]

## 2024-04-02 ENCOUNTER — HOSPITAL ENCOUNTER (OUTPATIENT)
Dept: REHABILITATION | Facility: CLINIC | Age: 74
Discharge: HOME | End: 2024-04-02
Attending: PSYCHIATRY & NEUROLOGY
Payer: MEDICAID

## 2024-04-02 PROCEDURE — S5100 ADULT DAYCARE SERVICES 15MIN: HCPCS

## 2024-04-04 ENCOUNTER — HOSPITAL ENCOUNTER (OUTPATIENT)
Dept: REHABILITATION | Facility: CLINIC | Age: 74
Discharge: HOME | End: 2024-04-04
Attending: PSYCHIATRY & NEUROLOGY
Payer: MEDICAID

## 2024-04-04 PROCEDURE — S5102 ADULT DAY CARE PER DIEM: HCPCS

## 2024-04-09 ENCOUNTER — HOSPITAL ENCOUNTER (OUTPATIENT)
Dept: REHABILITATION | Facility: CLINIC | Age: 74
Discharge: HOME | End: 2024-04-09
Attending: PSYCHIATRY & NEUROLOGY
Payer: MEDICAID

## 2024-04-09 PROCEDURE — S5100 ADULT DAYCARE SERVICES 15MIN: HCPCS

## 2024-04-11 ENCOUNTER — HOSPITAL ENCOUNTER (OUTPATIENT)
Dept: REHABILITATION | Facility: CLINIC | Age: 74
Discharge: HOME | End: 2024-04-11
Attending: PSYCHIATRY & NEUROLOGY
Payer: MEDICAID

## 2024-04-11 PROCEDURE — S5100 ADULT DAYCARE SERVICES 15MIN: HCPCS

## 2024-04-16 ENCOUNTER — HOSPITAL ENCOUNTER (OUTPATIENT)
Dept: REHABILITATION | Facility: CLINIC | Age: 74
Discharge: HOME | End: 2024-04-16
Attending: PSYCHIATRY & NEUROLOGY
Payer: MEDICAID

## 2024-04-16 PROCEDURE — S5102 ADULT DAY CARE PER DIEM: HCPCS

## 2024-04-18 ENCOUNTER — HOSPITAL ENCOUNTER (OUTPATIENT)
Dept: REHABILITATION | Facility: CLINIC | Age: 74
Discharge: HOME | End: 2024-04-18
Attending: PSYCHIATRY & NEUROLOGY
Payer: MEDICAID

## 2024-04-18 PROCEDURE — S5100 ADULT DAYCARE SERVICES 15MIN: HCPCS

## 2024-04-23 ENCOUNTER — HOSPITAL ENCOUNTER (OUTPATIENT)
Dept: REHABILITATION | Facility: CLINIC | Age: 74
Discharge: HOME | End: 2024-04-23
Attending: PSYCHIATRY & NEUROLOGY
Payer: MEDICAID

## 2024-04-23 PROCEDURE — S5100 ADULT DAYCARE SERVICES 15MIN: HCPCS

## 2024-04-23 NOTE — PROGRESS NOTES
MONTHLY PROGRESS NOTE AND PARTICIPATION REPORT   [facility]    Karen Angel, 1950  Attendance: Please see Epic for attendance record.    Communication:   Does communicate   Hearing:   No impairment  Vision:   Glasses  Orientation/Cognition:   Partial disorientation  Behavior:   No behavioral concerns  Self-Preservation Skills:   Not capable of self-preservation  Eating:   Assist with set up  Gemini has an essential tremor in BL UE's which makes it difficult for her to drink from a cup so she uses a straw and keeps the cup on the table while drinking.  Ambulation Walking:   Non-ambulatory  Transferring:   Aide of one person/two  Wheelchair:   Needs help  Toileting:   Needs assistance  Maintenance Program:     None  Living Arrangements:   Lives in assisted living facility  Spiritual Needs: Needs are being met through support group  Medication Assistance:   Medication not taken during program hours  Participation Report:   Aerobics/Exercise  Support Group  Cognitive Stimulation  Fire Drill  Creative Arts/Crafts  Games  Gardening  Speakers/Entertainment  Socialization  Current Events/News  Education/Health Topic  Gemini requires v/c's to initiate and continue activity participation ~75% of the time.  Level of Participation:   To the best of their ability    Gemini has enjoyed attempting new projects in the art room this month with encouragement. Gemini adds funny commentary throughout group conversations. Gemini must be carefully monitored to prevent her from leaving the room or taking food or drinks from others.

## 2024-04-25 ENCOUNTER — HOSPITAL ENCOUNTER (OUTPATIENT)
Dept: REHABILITATION | Facility: CLINIC | Age: 74
Discharge: HOME | End: 2024-04-25
Attending: PSYCHIATRY & NEUROLOGY
Payer: MEDICAID

## 2024-04-25 PROCEDURE — S5100 ADULT DAYCARE SERVICES 15MIN: HCPCS

## 2024-04-30 ENCOUNTER — HOSPITAL ENCOUNTER (OUTPATIENT)
Dept: REHABILITATION | Facility: CLINIC | Age: 74
Discharge: HOME | End: 2024-04-30
Attending: PSYCHIATRY & NEUROLOGY
Payer: MEDICAID

## 2024-04-30 PROCEDURE — S5100 ADULT DAYCARE SERVICES 15MIN: HCPCS

## 2024-05-02 ENCOUNTER — HOSPITAL ENCOUNTER (OUTPATIENT)
Dept: REHABILITATION | Facility: CLINIC | Age: 74
Discharge: HOME | End: 2024-05-02
Attending: PSYCHIATRY & NEUROLOGY
Payer: MEDICAID

## 2024-05-02 PROCEDURE — S5100 ADULT DAYCARE SERVICES 15MIN: HCPCS

## 2024-05-05 ENCOUNTER — HEALTH MAINTENANCE LETTER (OUTPATIENT)
Age: 74
End: 2024-05-05

## 2024-05-07 ENCOUNTER — HOSPITAL ENCOUNTER (OUTPATIENT)
Dept: REHABILITATION | Facility: CLINIC | Age: 74
Discharge: HOME | End: 2024-05-07
Attending: PSYCHIATRY & NEUROLOGY
Payer: MEDICAID

## 2024-05-07 PROCEDURE — S5100 ADULT DAYCARE SERVICES 15MIN: HCPCS

## 2024-05-07 NOTE — PROGRESS NOTES
[facility] RN Note    Previous documentation since 4/2/24 reviewed.  No concerns reported by AC staff or member.

## 2024-05-09 ENCOUNTER — HOSPITAL ENCOUNTER (OUTPATIENT)
Dept: REHABILITATION | Facility: CLINIC | Age: 74
Discharge: HOME | End: 2024-05-09
Attending: PSYCHIATRY & NEUROLOGY
Payer: MEDICAID

## 2024-05-09 PROCEDURE — S5102 ADULT DAY CARE PER DIEM: HCPCS

## 2024-05-14 ENCOUNTER — HOSPITAL ENCOUNTER (OUTPATIENT)
Dept: REHABILITATION | Facility: CLINIC | Age: 74
Discharge: HOME | End: 2024-05-14
Attending: PSYCHIATRY & NEUROLOGY
Payer: MEDICAID

## 2024-05-14 PROCEDURE — S5100 ADULT DAYCARE SERVICES 15MIN: HCPCS

## 2024-05-16 ENCOUNTER — HOSPITAL ENCOUNTER (OUTPATIENT)
Dept: REHABILITATION | Facility: CLINIC | Age: 74
Discharge: HOME | End: 2024-05-16
Attending: PSYCHIATRY & NEUROLOGY
Payer: MEDICAID

## 2024-05-16 PROCEDURE — S5100 ADULT DAYCARE SERVICES 15MIN: HCPCS

## 2024-05-21 ENCOUNTER — HOSPITAL ENCOUNTER (OUTPATIENT)
Dept: REHABILITATION | Facility: CLINIC | Age: 74
Discharge: HOME | End: 2024-05-21
Attending: PSYCHIATRY & NEUROLOGY
Payer: MEDICAID

## 2024-05-21 PROCEDURE — S5100 ADULT DAYCARE SERVICES 15MIN: HCPCS

## 2024-05-23 ENCOUNTER — HOSPITAL ENCOUNTER (OUTPATIENT)
Dept: REHABILITATION | Facility: CLINIC | Age: 74
Discharge: HOME | End: 2024-05-23
Attending: PSYCHIATRY & NEUROLOGY
Payer: MEDICAID

## 2024-05-23 PROCEDURE — S5102 ADULT DAY CARE PER DIEM: HCPCS

## 2024-05-28 ENCOUNTER — HOSPITAL ENCOUNTER (OUTPATIENT)
Dept: REHABILITATION | Facility: CLINIC | Age: 74
Discharge: HOME | End: 2024-05-28
Attending: PSYCHIATRY & NEUROLOGY
Payer: MEDICAID

## 2024-05-28 PROCEDURE — S5100 ADULT DAYCARE SERVICES 15MIN: HCPCS

## 2024-05-28 NOTE — PROGRESS NOTES
MONTHLY PROGRESS NOTE AND PARTICIPATION REPORT   [facility]    Karen Angel, 1950  Attendance: Please see Epic for attendance record.    Communication:   Does communicate   Hearing:   No impairment  Vision:   Glasses  Orientation/Cognition:   Partial disorientation  Behavior:   No behavioral concerns  Self-Preservation Skills:   Not capable of self-preservation  Eating:   Assist with set up  Gemini has an essential tremor in BL UE's which makes it difficult for her to drink from a cup so she uses a straw and keeps the cup on the table while drinking.  Gemini must be monitored to prevent her from taking food or beverages from others throughout the day or during lunch.  Ambulation Walking:   Non-ambulatory  Transferring:   Aide of one person/two  Wheelchair:   Needs help  Toileting:   Needs assistance  Maintenance Program:     None  Living Arrangements:   Lives in assisted living facility  Spiritual Needs: Needs are being met through support group  Medication Assistance:   Medication not taken during program hours  Participation Report:   Aerobics/Exercise  Support Group  Cognitive Stimulation  Fire Drill  Creative Arts/Crafts  Games  Gardening  Speakers/Entertainment  Socialization  Current Events/News  Education/Health Topic  Gemini requires v/c's to initiate and continue activity participation ~75% of the time.  Level of Participation:   To the best of their ability    Gemini is encouraged to work on ceramic painting projects in the art room, but often gets tired of this activity early on in the hour. Gemini makes jokes in conversations with others. Gemini participates in brain and body cognitive activities with encouragement.

## 2024-05-30 ENCOUNTER — HOSPITAL ENCOUNTER (OUTPATIENT)
Dept: REHABILITATION | Facility: CLINIC | Age: 74
Discharge: HOME | End: 2024-05-30
Attending: PSYCHIATRY & NEUROLOGY
Payer: MEDICAID

## 2024-05-30 PROCEDURE — S5100 ADULT DAYCARE SERVICES 15MIN: HCPCS

## 2024-06-04 ENCOUNTER — HOSPITAL ENCOUNTER (OUTPATIENT)
Dept: REHABILITATION | Facility: CLINIC | Age: 74
Discharge: HOME | End: 2024-06-04
Attending: PSYCHIATRY & NEUROLOGY
Payer: MEDICAID

## 2024-06-04 PROCEDURE — S5100 ADULT DAYCARE SERVICES 15MIN: HCPCS

## 2024-06-06 ENCOUNTER — HOSPITAL ENCOUNTER (OUTPATIENT)
Dept: REHABILITATION | Facility: CLINIC | Age: 74
Discharge: HOME | End: 2024-06-06
Attending: PSYCHIATRY & NEUROLOGY
Payer: MEDICAID

## 2024-06-06 PROCEDURE — S5102 ADULT DAY CARE PER DIEM: HCPCS

## 2024-06-07 NOTE — PATIENT INSTRUCTIONS
Dear valued patient, Multiple sclerosis (MS) is an unpredictable disease that can be hard to track and measure. The disease affects each patient differently. MS symptoms can change due to a variety of causes, but to identify true worsening of the disease, detailed measures are needed. These measures tend to be time consuming and hard to obtain during a regular follow up visit. To obtain these measures, [facility] is excited to offer you the opportunity to meet with our nurse practitioner for a Comprehensive Annual Reference Exam (CARE) visit that will allow us to determine whether and how your MS is changing over time. These measures allow us to be able to offer a higher level of care and better detect subtle changes in your neurologic condition. We can then personalize your care and adjust treatments to improve your quality of life.     What is different about this CARE visit? This is not your regular follow-up visit in the clinic where you usually discuss your recent symptoms, MS medications, MRI results, lab results, etc. Rather, this visit will collect data about your current MS status. Our highly trained staff will perform tests normally reserved for clinical research trials to determine how severe your MS is, and if how it is changing over time.    What to expect? This visit will be performed by our team's nurse practitioner. This visit will last about 2 hours. You will be given questions to answer about MS-related symptoms. There will be several tests to evaluate your current MS status, including a physical exam, an eye test (Optical Coherence Tomography- OCT), cognitive assessment, and other tests as needed.      How can it help you? This MS CARE visit gives you and your team a more complete picture of your health to improve your quality of life and detect changes that may not be obvious in a regular MS clinic visit. We hope and expect that these visits will  "help us \"fine-tune\" your medical care, to help you as much as we possibly can with living with MS.Our goal is to provide the best and most comprehensive MS care and to improve your health. If you have any suggestions on how we can do this, please let us know during this visit.     The MS Team                                                                                                                                  Irais Wilson MD Neurologist Medical Director, Multiple Sclerosis Division                                                         Elver Covarrubias MD Neurologist                                                                                                                                                                                                                                                                                                                                                                                                                 Darion Vora MD Neurologist                                                                                                                         LAURA Soto Certified Nurse Practitioner                                                                                                                                                                                                                                                                                                                                                                                                                                                                                                                                                                                                                                  " English

## 2024-06-11 ENCOUNTER — HOSPITAL ENCOUNTER (OUTPATIENT)
Dept: REHABILITATION | Facility: CLINIC | Age: 74
Discharge: HOME | End: 2024-06-11
Attending: PSYCHIATRY & NEUROLOGY
Payer: MEDICAID

## 2024-06-11 PROCEDURE — S5100 ADULT DAYCARE SERVICES 15MIN: HCPCS

## 2024-06-13 ENCOUNTER — HOSPITAL ENCOUNTER (OUTPATIENT)
Dept: REHABILITATION | Facility: CLINIC | Age: 74
Discharge: HOME | End: 2024-06-13
Attending: PSYCHIATRY & NEUROLOGY
Payer: MEDICAID

## 2024-06-13 PROCEDURE — S5102 ADULT DAY CARE PER DIEM: HCPCS

## 2024-06-18 ENCOUNTER — HOSPITAL ENCOUNTER (OUTPATIENT)
Dept: REHABILITATION | Facility: CLINIC | Age: 74
Discharge: HOME | End: 2024-06-18
Attending: PSYCHIATRY & NEUROLOGY
Payer: MEDICAID

## 2024-06-18 PROCEDURE — S5100 ADULT DAYCARE SERVICES 15MIN: HCPCS

## 2024-06-20 ENCOUNTER — HOSPITAL ENCOUNTER (OUTPATIENT)
Dept: REHABILITATION | Facility: CLINIC | Age: 74
Discharge: HOME | End: 2024-06-20
Attending: PSYCHIATRY & NEUROLOGY
Payer: MEDICAID

## 2024-06-20 PROCEDURE — S5102 ADULT DAY CARE PER DIEM: HCPCS

## 2024-06-25 ENCOUNTER — HOSPITAL ENCOUNTER (OUTPATIENT)
Dept: REHABILITATION | Facility: CLINIC | Age: 74
Discharge: HOME | End: 2024-06-25
Attending: PSYCHIATRY & NEUROLOGY
Payer: MEDICAID

## 2024-06-25 PROCEDURE — S5100 ADULT DAYCARE SERVICES 15MIN: HCPCS

## 2024-06-25 NOTE — PROGRESS NOTES
Individual abuse prevention plan (IAPP)  [facility]     Assessment of Susceptibility to Abuse, Including Self Abuse, Neglect (Identification of characteristics, which make the individual susceptible to abuse, and how these characteristics cause the individual to be susceptible to abuse.)     Is the person susceptible to abuse in each area?  Sexual Abuse:   No  Referrals made when the person is susceptible to abuse outside the scope or control of this program (Identify the referral and date it occurred): No additional risk reduction means needed at this time    Physical Abuse:   No   Referrals made when the person is susceptible to abuse outside the scope or control of this program (Identify the referral and date it occurred): No additional risk reduction means needed at this time     Self-Abuse:   No   Referrals made when the person is susceptible to abuse outside the scope or control of this program (Identify the referral and date it occurred): No additional risk reduction means needed at this time     Financial  Exploitation  No   Referrals made when the person is susceptible to abuse outside the scope or control of this program (Identify the referral and date it occurred): No additional risk reduction means needed at this time     Is the program aware of this person committing a violent crime or act of physical aggression towards others?  No   Referrals made when the person is susceptible to abuse outside the scope or control of this program (Identify the referral and date it occurred): No additional risk reduction means needed at this time     INDIVIDUAL ABUSE PREVENTION PLAN-MEASURES TAKEN TO MINIMIZE RISK OF ABUSE   ADL:   Assist with clothing management  Assist with toileting     Ambulation/Transfers/Wheelchairs:  Provide assistance prn for propelling wheelchair   Behavior:   Monitor client's agitation level and redirect when appropriate  Will provide cuing to keep the patient engaged in the  activity.    Will closely monitor behavior for safety (has attempted to put foreign objects in her mouth)  Communication:  Encourage verbalization of needs/concerns  Observe body language/gestures to assist in anticipating client's needs  Cognitive Issues:   Provider reminders due to confusion, forgetfulness     Diet:   Staff will prepare food to facilitate client to feed self  Exercise:   Encourage participation in wheelchair aerobics  Hearing:   Seat client near  during group activities  Isolation:   Encourage socialization due to isolation in home environment  Medical Monitoring:   Encourage rest period  Mental Health:   Motivate client to join in activities that are beneficial to client  Encourage client to express feelings  Offer emotional support  Encourage independent decision making  Provide activities in which client can be successful  Sensory:   Provide variety of sensory groups to maintain current cognitive level  Vision:   Provide verbal cues  Encourage client to position self to front of groups to maximize visual ability  Other:       Developed in consultation with:  Client  The Program Abuse Prevention Plan/IAPP identifies the specific actions that minimize abuse to the [facility] participant.  Yes     6/25/2024:  IAPP continues to be appropriate.

## 2024-06-25 NOTE — PROGRESS NOTES
INDIVIDUAL PLAN OF CARE   [facility]    Member Name: Karen Angel; YOB: 1950  MRN: [id number]  6/24/2024    Goals developed in collaboration with: member  Staff responsible for plan: Candida GARCIA/RICKEY, Yisel Mcdaniels, Rehab Tech/ Kavon Lopez, Rehab Tech  1. Long Term Goal (concrete, measurable, and time specific outcomes): Member will maximize level of independence in by activly participating in cognitive and physical activities with staff and peers each day of attending the day program..  Target Date:  March 2025  Quarterly Review:  Goal remains appropriate  Gemini requires verbal cues from staff and other members for activity participation ~75% of the time.      2. Short Term Goal: (concrete, measurable, and time specific outcomes):   Member will actively participate in Brain and Body cognitive activities, with staff set up and verbal cues to participate provided, each day of program attendance.  Target Date:  September 2024  Quarterly Review:  Goal remains appropriate    3. Short Term Goal: (concrete, measurable, and time specific outcomes): Member will actively participate in aerobic exercises each day of program attendance.  Verbal cues to participate provided by staff.  Target Date:  September 2024  Quarterly Review:  Goal remains appropriate. She will willingly participate but is often known to rest her head on the table during activity.

## 2024-06-25 NOTE — PROGRESS NOTES
MONTHLY PROGRESS NOTE AND PARTICIPATION REPORT   [facility]    Karen Angel, 1950  Attendance: Please see Epic for attendance record.    Communication:   Does communicate   Soft spoken.  Does not initiate a conversation but will speak when spoken to.   Hearing:   No impairment  Vision:   Intact  Orientation/Cognition:   Partial disorientation  Behavior:   No behavioral concerns  Monitor behavior so she does not put foreign objects in her mouth.   Self-Preservation Skills:   Not capable of self-preservation  Eating:   Assist with set up  Gemini has an essential tremor in BL UE's which makes it difficult for her to drink from a cup so she uses a straw and keeps the cup on the table while drinking.  Ambulation Walking:   Non-ambulatory  Transferring:   Aide of one person/two  Wheelchair:   Needs help  Toileting:   Needs assistance  Maintenance Program:     None  Living Arrangements:   Lives in assisted living facility  Spiritual Needs: Needs are being met through support group  Medication Assistance:   Medication not taken during program hours  Participation Report:   Aerobics/Exercise  Support Group  Cognitive Stimulation  Fire Drill  Creative Arts/Crafts  Games  Gardening  Speakers/Entertainment  Socialization  Current Events/News  Education/Health Topic  Gemini requires v/c's to initiate and continue activity participation ~75% of the time.  Level of Participation:   To the best of their ability    Gemini continues to enjoy the art room. She is very pleasant but does not initiate conversation on her own.  She needs cuing to stay on task.

## 2024-06-27 ENCOUNTER — HOSPITAL ENCOUNTER (OUTPATIENT)
Dept: REHABILITATION | Facility: CLINIC | Age: 74
Discharge: HOME | End: 2024-06-27
Attending: PSYCHIATRY & NEUROLOGY
Payer: MEDICAID

## 2024-06-27 PROCEDURE — S5100 ADULT DAYCARE SERVICES 15MIN: HCPCS

## 2024-07-02 ENCOUNTER — HOSPITAL ENCOUNTER (OUTPATIENT)
Dept: REHABILITATION | Facility: CLINIC | Age: 74
Discharge: HOME | End: 2024-07-02
Attending: PSYCHIATRY & NEUROLOGY
Payer: MEDICAID

## 2024-07-02 PROCEDURE — S5100 ADULT DAYCARE SERVICES 15MIN: HCPCS

## 2024-07-09 ENCOUNTER — HOSPITAL ENCOUNTER (OUTPATIENT)
Dept: REHABILITATION | Facility: CLINIC | Age: 74
Discharge: HOME | End: 2024-07-09
Attending: PSYCHIATRY & NEUROLOGY
Payer: MEDICAID

## 2024-07-09 PROCEDURE — S5100 ADULT DAYCARE SERVICES 15MIN: HCPCS

## 2024-07-11 ENCOUNTER — HOSPITAL ENCOUNTER (OUTPATIENT)
Dept: REHABILITATION | Facility: CLINIC | Age: 74
Discharge: HOME | End: 2024-07-11
Attending: PSYCHIATRY & NEUROLOGY
Payer: MEDICAID

## 2024-07-11 PROCEDURE — S5100 ADULT DAYCARE SERVICES 15MIN: HCPCS

## 2024-07-14 ENCOUNTER — HEALTH MAINTENANCE LETTER (OUTPATIENT)
Age: 74
End: 2024-07-14

## 2024-07-16 ENCOUNTER — HOSPITAL ENCOUNTER (OUTPATIENT)
Dept: REHABILITATION | Facility: CLINIC | Age: 74
Discharge: HOME | End: 2024-07-16
Attending: PSYCHIATRY & NEUROLOGY
Payer: MEDICAID

## 2024-07-16 PROCEDURE — S5100 ADULT DAYCARE SERVICES 15MIN: HCPCS

## 2024-07-18 ENCOUNTER — HOSPITAL ENCOUNTER (OUTPATIENT)
Dept: REHABILITATION | Facility: CLINIC | Age: 74
Discharge: HOME | End: 2024-07-18
Attending: PSYCHIATRY & NEUROLOGY
Payer: MEDICAID

## 2024-07-18 PROCEDURE — S5100 ADULT DAYCARE SERVICES 15MIN: HCPCS

## 2024-07-23 ENCOUNTER — HOSPITAL ENCOUNTER (OUTPATIENT)
Dept: REHABILITATION | Facility: CLINIC | Age: 74
Discharge: HOME | End: 2024-07-23
Attending: PSYCHIATRY & NEUROLOGY
Payer: MEDICAID

## 2024-07-23 PROCEDURE — S5102 ADULT DAY CARE PER DIEM: HCPCS

## 2024-07-23 NOTE — PROGRESS NOTES
MONTHLY PROGRESS NOTE AND PARTICIPATION REPORT   [facility]    Karen Angel, 1950  Attendance: Please see Epic for attendance record.    Communication:   Does communicate   Soft spoken.  Does not initiate a conversation but will speak when spoken to.   Hearing:   No impairment  Vision:   Intact  Orientation/Cognition:   Partial disorientation  Behavior:   No behavioral concerns  Monitor behavior so she does not put foreign objects in her mouth.   Self-Preservation Skills:   Not capable of self-preservation  Eating:   Assist with set up  Gemini has an essential tremor in BL UE's which makes it difficult for her to drink from a cup so she uses a straw and keeps the cup on the table while drinking.  Ambulation Walking:   Non-ambulatory  Transferring:   Aide of one person/two  Wheelchair:   Needs help  Toileting:   Needs assistance  Maintenance Program:     None  Living Arrangements:   Lives in assisted living facility  Spiritual Needs: Needs are being met through support group  Medication Assistance:   Medication not taken during program hours  Participation Report:   Aerobics/Exercise  Support Group  Cognitive Stimulation  Fire Drill  Creative Arts/Crafts  Games  Gardening  Speakers/Entertainment  Socialization  Current Events/News  Education/Health Topic  Gemini requires v/c's to initiate and continue activity participation ~75% of the time.  Level of Participation:   To the best of their ability    Gemini shows up to the Day Program with a positive attitude and often likes to make jokes. sIabel sometimes gets off task and she needs encouragement to begin her task again. Gemini has showed a slowed appetite as well when served lunch at the day program.

## 2024-07-25 ENCOUNTER — HOSPITAL ENCOUNTER (OUTPATIENT)
Dept: REHABILITATION | Facility: CLINIC | Age: 74
Discharge: HOME | End: 2024-07-25
Attending: PSYCHIATRY & NEUROLOGY
Payer: MEDICAID

## 2024-07-25 PROCEDURE — S5100 ADULT DAYCARE SERVICES 15MIN: HCPCS

## 2024-07-30 ENCOUNTER — HOSPITAL ENCOUNTER (OUTPATIENT)
Dept: REHABILITATION | Facility: CLINIC | Age: 74
Discharge: HOME | End: 2024-07-30
Attending: PSYCHIATRY & NEUROLOGY
Payer: MEDICAID

## 2024-07-30 PROCEDURE — S5100 ADULT DAYCARE SERVICES 15MIN: HCPCS

## 2024-08-01 ENCOUNTER — HOSPITAL ENCOUNTER (OUTPATIENT)
Dept: REHABILITATION | Facility: CLINIC | Age: 74
Discharge: HOME | End: 2024-08-01
Attending: PSYCHIATRY & NEUROLOGY
Payer: MEDICAID

## 2024-08-01 PROCEDURE — S5100 ADULT DAYCARE SERVICES 15MIN: HCPCS

## 2024-08-01 NOTE — PROGRESS NOTES
[facility] RN Note    Previous documentation since 7/2/24 reviewed.  No concerns reported by AC staff or member.

## 2024-08-06 ENCOUNTER — HOSPITAL ENCOUNTER (OUTPATIENT)
Dept: REHABILITATION | Facility: CLINIC | Age: 74
Discharge: HOME | End: 2024-08-06
Attending: PSYCHIATRY & NEUROLOGY
Payer: MEDICAID

## 2024-08-06 PROCEDURE — S5102 ADULT DAY CARE PER DIEM: HCPCS | Performed by: OCCUPATIONAL THERAPIST

## 2024-08-08 ENCOUNTER — HOSPITAL ENCOUNTER (OUTPATIENT)
Dept: REHABILITATION | Facility: CLINIC | Age: 74
Discharge: HOME | End: 2024-08-08
Attending: PSYCHIATRY & NEUROLOGY
Payer: MEDICAID

## 2024-08-08 PROCEDURE — S5100 ADULT DAYCARE SERVICES 15MIN: HCPCS

## 2024-08-13 ENCOUNTER — HOSPITAL ENCOUNTER (OUTPATIENT)
Dept: REHABILITATION | Facility: CLINIC | Age: 74
Discharge: HOME | End: 2024-08-13
Attending: PSYCHIATRY & NEUROLOGY
Payer: MEDICAID

## 2024-08-13 PROCEDURE — S5100 ADULT DAYCARE SERVICES 15MIN: HCPCS

## 2024-08-15 ENCOUNTER — HOSPITAL ENCOUNTER (OUTPATIENT)
Dept: REHABILITATION | Facility: CLINIC | Age: 74
Discharge: HOME | End: 2024-08-15
Attending: PSYCHIATRY & NEUROLOGY
Payer: MEDICAID

## 2024-08-15 PROCEDURE — S5100 ADULT DAYCARE SERVICES 15MIN: HCPCS

## 2024-08-19 NOTE — PROGRESS NOTES
MONTHLY PROGRESS NOTE AND PARTICIPATION REPORT   [facility]    Karen Angel, 1950  Attendance: Please see Epic for attendance record.    Communication:   Does communicate   Soft spoken.  Does not initiate a conversation but will speak when spoken to.   Hearing:   No impairment  Vision:   Intact  Orientation/Cognition:   Partial disorientation  Behavior:   No behavioral concerns  Monitor behavior so she does not put foreign objects in her mouth.   Self-Preservation Skills:   Not capable of self-preservation  Eating:   Assist with set up  Gemini has an essential tremor in BL UE's which makes it difficult for her to drink from a cup so she uses a straw and keeps the cup on the table while drinking.  Ambulation Walking:   Non-ambulatory  Transferring:   Aide of one person/two  Wheelchair:   Needs help  Toileting:   Needs assistance  Maintenance Program:     None  Living Arrangements:   Lives in assisted living facility  Spiritual Needs: Needs are being met through support group  Medication Assistance:   Medication not taken during program hours  Participation Report:   Aerobics/Exercise  Support Group  Cognitive Stimulation  Fire Drill  Creative Arts/Crafts  Games  Gardening  Speakers/Entertainment  Socialization  Current Events/News  Education/Health Topic  Gemini requires v/c's to initiate and continue activity participation ~75% of the time.  Level of Participation:   To the best of their ability    Gemini has been doing well at day program recently, and just went on the field trip to the [employer]. In brain and body, she has done a great job listening, and socializing with the other members.

## 2024-08-20 ENCOUNTER — HOSPITAL ENCOUNTER (OUTPATIENT)
Dept: REHABILITATION | Facility: CLINIC | Age: 74
Discharge: HOME | End: 2024-08-20
Attending: PSYCHIATRY & NEUROLOGY
Payer: MEDICAID

## 2024-08-20 PROCEDURE — S5100 ADULT DAYCARE SERVICES 15MIN: HCPCS

## 2024-08-22 ENCOUNTER — HOSPITAL ENCOUNTER (OUTPATIENT)
Dept: REHABILITATION | Facility: CLINIC | Age: 74
Discharge: HOME | End: 2024-08-22
Attending: PSYCHIATRY & NEUROLOGY
Payer: MEDICAID

## 2024-08-22 PROCEDURE — S5100 ADULT DAYCARE SERVICES 15MIN: HCPCS

## 2024-08-27 ENCOUNTER — HOSPITAL ENCOUNTER (OUTPATIENT)
Dept: REHABILITATION | Facility: CLINIC | Age: 74
Discharge: HOME | End: 2024-08-27
Attending: PSYCHIATRY & NEUROLOGY
Payer: MEDICAID

## 2024-08-27 PROCEDURE — S5100 ADULT DAYCARE SERVICES 15MIN: HCPCS

## 2024-09-03 ENCOUNTER — HOSPITAL ENCOUNTER (OUTPATIENT)
Dept: REHABILITATION | Facility: CLINIC | Age: 74
Discharge: HOME | End: 2024-09-03
Attending: PSYCHIATRY & NEUROLOGY
Payer: MEDICAID

## 2024-09-03 PROCEDURE — S5100 ADULT DAYCARE SERVICES 15MIN: HCPCS

## 2024-09-03 NOTE — PROGRESS NOTES
Pt leaning to right side in w/c more than usual, pillow was placed on right side of w/c to assist with keeping pt upright..  Pt able to complete verbal cues aprox. 50% of the time.  Pt stated she was dizzy when being pushed in w/c.  Vitals were taken, BP: 124/92, O2: 97%, Pulse: 99, Temperature: 98.3.  RN at pt's house was called by JOSE/L, no answer, voicemail left for RN.

## 2024-09-03 NOTE — PROGRESS NOTES
"Before pt left on [employer] bus, the pillow that was keeping pt upright was replaced with 3 rolled up towels to help with pt to sit upright while going home.  JOSE was called out to bus after  took pt to bus to assist with pt as pt was confused about where she was.  Pt stated, \"I am just trying to get into my room,\" as she was resisting help from .  GARCIA explained to pt that the  would take pt home on the bus.  After an explanation, pt willingly let  assist pt onto bus.  Pt also stated that her left shoulder was hurting as she was getting onto the bus.  "

## 2024-09-05 ENCOUNTER — HOSPITAL ENCOUNTER (OUTPATIENT)
Dept: REHABILITATION | Facility: CLINIC | Age: 74
Discharge: HOME | End: 2024-09-05
Attending: PSYCHIATRY & NEUROLOGY
Payer: MEDICAID

## 2024-09-05 PROCEDURE — S5100 ADULT DAYCARE SERVICES 15MIN: HCPCS

## 2024-09-05 NOTE — PROGRESS NOTES
[facility] RN Note    Previous documentation since 8/1/24 reviewed.  Participant not in facility at this time.       Phone call to group home to discuss symptoms noted by staff on 9/3/24 (Increased leaning, confusion).     Phone call to the group home on duty nurse. No answer, recording states that the mailbox is full so unable to leave a message.     Phone call to group home. No answer. After numerous rings it stopped ringing with no option to leave a voicemail.

## 2024-09-10 ENCOUNTER — OFFICE VISIT (OUTPATIENT)
Dept: NEUROLOGY | Facility: CLINIC | Age: 74
End: 2024-09-10
Attending: PSYCHIATRY & NEUROLOGY
Payer: COMMERCIAL

## 2024-09-10 VITALS — OXYGEN SATURATION: 93 % | HEART RATE: 88 BPM | DIASTOLIC BLOOD PRESSURE: 46 MMHG | SYSTOLIC BLOOD PRESSURE: 96 MMHG

## 2024-09-10 DIAGNOSIS — Z91.81 RISK FOR FALLS: ICD-10-CM

## 2024-09-10 DIAGNOSIS — F02.818 DEMENTIA DUE TO MEDICAL CONDITION WITH BEHAVIORAL DISTURBANCE (H): ICD-10-CM

## 2024-09-10 DIAGNOSIS — G35 MS (MULTIPLE SCLEROSIS) (H): Primary | ICD-10-CM

## 2024-09-10 DIAGNOSIS — R25.1 TREMOR: ICD-10-CM

## 2024-09-10 DIAGNOSIS — R41.4 HEMI-NEGLECT OF LEFT SIDE: ICD-10-CM

## 2024-09-10 PROCEDURE — G0463 HOSPITAL OUTPT CLINIC VISIT: HCPCS | Performed by: PSYCHIATRY & NEUROLOGY

## 2024-09-10 PROCEDURE — 99213 OFFICE O/P EST LOW 20 MIN: CPT | Performed by: PSYCHIATRY & NEUROLOGY

## 2024-09-10 ASSESSMENT — PAIN SCALES - GENERAL: PAINLEVEL: NO PAIN (0)

## 2024-09-10 NOTE — PROGRESS NOTES
ID: Karen Angel is a 74-year-old woman who I follow for multiple sclerosis.  She returns for annual follow-up.    HPI: Karen tells me she has been stable from an MS perspective.  She has quite severe cognitive impairment but her family member who accompanies her basically agrees.  She has what is felt to be primary progressive MS with dementia, gait impairment, and cerebellar tremor as her primary residual symptoms.  She also had a stroke which is left her with left hemineglect.  At last visit she remained hypertensive and I had added losartan, but nursing home was sending me blood pressure readings every 2 weeks which were fine.  At that point we decided to let primary care handle that.  She has not had any further strokes, and the tremor has been stable.  She can feed herself finger foods but not use utensils.  She is able to get to the toilet with assistance.  She goes to  at the MS [facility] center 3 days/week.    Past Medical History:   Diagnosis Date    Abnormal brain MRI 9/30/2019    Narrative & Impression   MRI BRAIN WITHOUT AND WITH CONTRAST  9/30/2019 11:17 AM    HISTORY:  Neuro deficit(s), subacute. Tremor. Multiple sclerosis (H).   TECHNIQUE:  Multiplanar, multisequence MRI of the brain without and with 7 mL Gadavist.   COMPARISON: Outside MRI head 10/22/2018.   FINDINGS: There is no restricted diffusion to suggest acute infarct. Confluent areas of T2/T2 FLAIR signal abno    Dementia (H)     Migraine     Multiple sclerosis, primary progressive (H)         Current Outpatient Medications:     amLODIPine (NORVASC) 5 MG tablet, Take 5 mg by mouth daily, Disp: , Rfl:     citalopram (CELEXA) 20 MG tablet, Take 20 mg by mouth daily Take 1 tablet by mouth daily @@ 8am, Disp: , Rfl:     clonazePAM (KLONOPIN) 0.5 MG tablet, Take 1 tablet (0.5 mg) by mouth 2 times daily as needed for anxiety, Disp: 60 tablet, Rfl: 3    FIBER LAXATIVE 0.52 g capsule, daily, Disp: , Rfl:     guaiFENesin (MUCINEX) 600  MG 12 hr tablet, Take 1 tablet twice a day as needed, Disp: , Rfl:     losartan (COZAAR) 50 MG tablet, Take 1 tablet (50 mg) by mouth daily, Disp: 90 tablet, Rfl: 3    memantine (NAMENDA) 10 MG tablet, Take 1 tablet (10 mg) by mouth 2 times daily Start with 5 mg BID x 1 month then increase to 10 mg in the AM and 5 in Pm x 1 month and then 10 mg BID, Disp: 60 tablet, Rfl: 3    rosuvastatin (CRESTOR) 40 MG tablet, Take 40 mg by mouth daily, Disp: , Rfl:     traZODone (DESYREL) 100 MG tablet, Take 1 tablet by mouth with food or snack @@ 8pm, Disp: , Rfl:     triamcinolone (KENALOG) 0.025 % external ointment, Apply topically 2 times daily, Disp: , Rfl:     vitamin D3 (CHOLECALCIFEROL) 2000 units (50 mcg) tablet, Take 1 tablet by mouth daily @@ 8am (Patient not taking: Reported on 8/16/2022), Disp: , Rfl:      Exam: She is alert, not oriented to place or time.  Expressive language is normal and she answers questions appropriately but has difficulty following instructions on exam.  There does seem to be residual hemineglect to both vision and touch.  Smooth pursuit eye movements are choppy.  She has obvious memory and attentional deficits.  She has neck tremor and bilateral cerebellar tremor in the arms.  Motor strength in the arms seemed fairly intact but hip flexion is weak bilaterally at 4/5 as is hip flexion.    Impression: Primary progressive MS, stable but with advanced neurologic disability primarily manifest as dementia and cerebellar tremor.  I spent 21 minutes on her care on the date of service including chart review and face-to-face time.  I discussed with her relative, Marge, that at this point her MS has almost certainly plateaued, and I do not think I have much to offer in terms of ongoing management of MS.  I told her I think at this point she can graduate from MS clinic clinic and just follow-up with primary care.  They were satisfied with that and I told him should some new neurologic issue arise they can  certainly contact me in we will see her again in that case.    Plan: Discharge from MS clinic.    This note was completed in part using voice-recognition software, and some typographic errors may be present as a result.

## 2024-09-10 NOTE — LETTER
9/10/2024       RE: Karen Angel  [address] [address] [facility] [address]     Dear Colleague,    Thank you for referring your patient, Karen Angel, to the [facility] [place] [facility]. Please see a copy of my visit note below.    ID: Karen Angel is a 74-year-old woman who I follow for multiple sclerosis.  She returns for annual follow-up.    HPI: Karen tells me she has been stable from an MS perspective.  She has quite severe cognitive impairment but her family member who accompanies her basically agrees.  She has what is felt to be primary progressive MS with dementia, gait impairment, and cerebellar tremor as her primary residual symptoms.  She also had a stroke which is left her with left hemineglect.  At last visit she remained hypertensive and I had added losartan, but nursing home was sending me blood pressure readings every 2 weeks which were fine.  At that point we decided to let primary care handle that.  She has not had any further strokes, and the tremor has been stable.  She can feed herself finger foods but not use utensils.  She is able to get to the toilet with assistance.  She goes to  at the MS [facility] center 3 days/week.    Past Medical History:   Diagnosis Date     Abnormal brain MRI 9/30/2019    Narrative & Impression   MRI BRAIN WITHOUT AND WITH CONTRAST  9/30/2019 11:17 AM    HISTORY:  Neuro deficit(s), subacute. Tremor. Multiple sclerosis (H).   TECHNIQUE:  Multiplanar, multisequence MRI of the brain without and with 7 mL Gadavist.   COMPARISON: Outside MRI head 10/22/2018.   FINDINGS: There is no restricted diffusion to suggest acute infarct. Confluent areas of T2/T2 FLAIR signal abno     Dementia (H)      Migraine      Multiple sclerosis, primary progressive (H)         Current Outpatient Medications:      amLODIPine (NORVASC) 5 MG tablet, Take 5 mg by mouth daily, Disp: , Rfl:       citalopram (CELEXA) 20 MG tablet, Take 20 mg by mouth daily Take 1 tablet by mouth daily @@ 8am, Disp: , Rfl:      clonazePAM (KLONOPIN) 0.5 MG tablet, Take 1 tablet (0.5 mg) by mouth 2 times daily as needed for anxiety, Disp: 60 tablet, Rfl: 3     FIBER LAXATIVE 0.52 g capsule, daily, Disp: , Rfl:      guaiFENesin (MUCINEX) 600 MG 12 hr tablet, Take 1 tablet twice a day as needed, Disp: , Rfl:      losartan (COZAAR) 50 MG tablet, Take 1 tablet (50 mg) by mouth daily, Disp: 90 tablet, Rfl: 3     memantine (NAMENDA) 10 MG tablet, Take 1 tablet (10 mg) by mouth 2 times daily Start with 5 mg BID x 1 month then increase to 10 mg in the AM and 5 in Pm x 1 month and then 10 mg BID, Disp: 60 tablet, Rfl: 3     rosuvastatin (CRESTOR) 40 MG tablet, Take 40 mg by mouth daily, Disp: , Rfl:      traZODone (DESYREL) 100 MG tablet, Take 1 tablet by mouth with food or snack @@ 8pm, Disp: , Rfl:      triamcinolone (KENALOG) 0.025 % external ointment, Apply topically 2 times daily, Disp: , Rfl:      vitamin D3 (CHOLECALCIFEROL) 2000 units (50 mcg) tablet, Take 1 tablet by mouth daily @@ 8am (Patient not taking: Reported on 8/16/2022), Disp: , Rfl:      Exam: She is alert, not oriented to place or time.  Expressive language is normal and she answers questions appropriately but has difficulty following instructions on exam.  There does seem to be residual hemineglect to both vision and touch.  Smooth pursuit eye movements are choppy.  She has obvious memory and attentional deficits.  She has neck tremor and bilateral cerebellar tremor in the arms.  Motor strength in the arms seemed fairly intact but hip flexion is weak bilaterally at 4/5 as is hip flexion.    Impression: Primary progressive MS, stable but with advanced neurologic disability primarily manifest as dementia and cerebellar tremor.  I spent 21 minutes on her care on the date of service including chart review and face-to-face time.  I discussed with her relative,  Marge, that at this point her MS has almost certainly plateaued, and I do not think I have much to offer in terms of ongoing management of MS.  I told her I think at this point she can graduate from MS clinic clinic and just follow-up with primary care.  They were satisfied with that and I told him should some new neurologic issue arise they can certainly contact me in we will see her again in that case.    Plan: Discharge from MS clinic.    This note was completed in part using voice-recognition software, and some typographic errors may be present as a result.       Again, thank you for allowing me to participate in the care of your patient.      Sincerely,    Elver Covarrubias MD

## 2024-09-10 NOTE — NURSING NOTE
Chief Complaint   Patient presents with    RECHECK    MS     Vitals were taken and medications were reconciled.    Manoj Lopez, EMT  2:27 PM

## 2024-09-11 ENCOUNTER — HOSPITAL ENCOUNTER (OUTPATIENT)
Dept: REHABILITATION | Facility: CLINIC | Age: 74
Discharge: HOME | End: 2024-09-11
Attending: PSYCHIATRY & NEUROLOGY
Payer: MEDICAID

## 2024-09-11 PROCEDURE — S5100 ADULT DAYCARE SERVICES 15MIN: HCPCS

## 2024-09-12 ENCOUNTER — HOSPITAL ENCOUNTER (OUTPATIENT)
Dept: REHABILITATION | Facility: CLINIC | Age: 74
Discharge: HOME | End: 2024-09-12
Attending: PSYCHIATRY & NEUROLOGY
Payer: MEDICAID

## 2024-09-12 PROCEDURE — S5100 ADULT DAYCARE SERVICES 15MIN: HCPCS

## 2024-09-12 NOTE — PROGRESS NOTES
MONTHLY PROGRESS NOTE AND PARTICIPATION REPORT   [facility]    Karen Angel, 1950  Attendance: Please see Epic for attendance record.    Communication:   Does communicate   Soft spoken.  Does not initiate a conversation but will speak when spoken to.   Hearing:   No impairment  Vision:   Intact  Orientation/Cognition:   Partial disorientation  Behavior:   No behavioral concerns  Monitor behavior so she does not put foreign objects in her mouth.   Self-Preservation Skills:   Not capable of self-preservation  Eating:   Assist with set up  Gemini has an essential tremor in BL UE's which makes it difficult for her to drink from a cup so she uses a straw and keeps the cup on the table while drinking.  Ambulation Walking:   Non-ambulatory  Transferring:   Aide of one person/two  Wheelchair:   Needs help  Toileting:   Needs assistance  Maintenance Program:     None  Living Arrangements:   Lives in assisted living facility  Spiritual Needs: Needs are being met through support group  Medication Assistance:   Medication not taken during program hours  Participation Report:   Aerobics/Exercise  Support Group  Cognitive Stimulation  Fire Drill  Creative Arts/Crafts  Games  Gardening  Speakers/Entertainment  Socialization  Current Events/News  Education/Health Topic  Gemini requires v/c's to initiate and continue activity participation ~75% of the time.  Level of Participation:   To the best of their ability    Gemini continues to enjoy her time at the day program.  Gemini often rests her head on the table.  Gemini was having trouble one day in the last few weeks that she was leaning towards her left and had increased difficulty with following verbal and tactile cues.  GARCIA took vitals and contacted Gemini's house nurse.  More info in progress note written.  Gemini has seemed to be back to her normal self since that day.

## 2024-09-12 NOTE — PROGRESS NOTES
INDIVIDUAL PLAN OF CARE   [facility]    Member Name: Karen Angel; YOB: 1950  MRN: [id number]  6/24/2024    Goals developed in collaboration with: member  Staff responsible for plan: Candida GARCIA/RICKEY, Yisel Mcdaniels, Rehab Tech/ Kavon Lopez, Rehab Tech  1. Long Term Goal (concrete, measurable, and time specific outcomes): Member will maximize level of independence in by activly participating in cognitive and physical activities with staff and peers each day of attending the day program..  Target Date:  March 2025  Quarterly Review:  Goal remains appropriate  Gemini requires verbal cues from staff and other members for activity participation ~75% of the time.      2. Short Term Goal: (concrete, measurable, and time specific outcomes):   Member will actively participate in Brain and Body cognitive activities, with staff set up and verbal cues to participate provided, each day of program attendance.  Target Date:  September 2024  Quarterly Review:  Goal remains appropriate    3. Short Term Goal: (concrete, measurable, and time specific outcomes): Member will actively participate in aerobic exercises each day of program attendance.  Verbal cues to participate provided by staff.  Target Date:  September 2024  Quarterly Review:  Goal remains appropriate. She will willingly participate but is often known to rest her head on the table during activity.

## 2024-09-12 NOTE — PROGRESS NOTES
Individual abuse prevention plan (IAPP)  [facility]     Assessment of Susceptibility to Abuse, Including Self Abuse, Neglect (Identification of characteristics, which make the individual susceptible to abuse, and how these characteristics cause the individual to be susceptible to abuse.)     Is the person susceptible to abuse in each area?  Sexual Abuse:   No  Referrals made when the person is susceptible to abuse outside the scope or control of this program (Identify the referral and date it occurred): No additional risk reduction means needed at this time    Physical Abuse:   No   Referrals made when the person is susceptible to abuse outside the scope or control of this program (Identify the referral and date it occurred): No additional risk reduction means needed at this time     Self-Abuse:   No   Referrals made when the person is susceptible to abuse outside the scope or control of this program (Identify the referral and date it occurred): No additional risk reduction means needed at this time     Financial  Exploitation  No   Referrals made when the person is susceptible to abuse outside the scope or control of this program (Identify the referral and date it occurred): No additional risk reduction means needed at this time     Is the program aware of this person committing a violent crime or act of physical aggression towards others?  No   Referrals made when the person is susceptible to abuse outside the scope or control of this program (Identify the referral and date it occurred): No additional risk reduction means needed at this time     INDIVIDUAL ABUSE PREVENTION PLAN-MEASURES TAKEN TO MINIMIZE RISK OF ABUSE   ADL:   Assist with clothing management  Assist with toileting     Ambulation/Transfers/Wheelchairs:  Provide assistance prn for propelling wheelchair   Behavior:   Monitor client's agitation level and redirect when appropriate  Will provide cuing to keep the patient engaged in the  activity.    Will closely monitor behavior for safety (has attempted to put foreign objects in her mouth)  Communication:  Encourage verbalization of needs/concerns  Observe body language/gestures to assist in anticipating client's needs  Cognitive Issues:   Provider reminders due to confusion, forgetfulness     Diet:   Staff will prepare food to facilitate client to feed self  Exercise:   Encourage participation in wheelchair aerobics  Hearing:   Seat client near  during group activities  Isolation:   Encourage socialization due to isolation in home environment  Medical Monitoring:   Encourage rest period  Mental Health:   Motivate client to join in activities that are beneficial to client  Encourage client to express feelings  Offer emotional support  Encourage independent decision making  Provide activities in which client can be successful  Sensory:   Provide variety of sensory groups to maintain current cognitive level  Vision:   Provide verbal cues  Encourage client to position self to front of groups to maximize visual ability  Other:       Developed in consultation with:  Client  The Program Abuse Prevention Plan/IAPP identifies the specific actions that minimize abuse to the [facility] participant.  Yes     9/12/2024:  IAPP continues to be appropriate.

## 2024-09-17 ENCOUNTER — HOSPITAL ENCOUNTER (OUTPATIENT)
Dept: REHABILITATION | Facility: CLINIC | Age: 74
Discharge: HOME | End: 2024-09-17
Attending: PSYCHIATRY & NEUROLOGY
Payer: MEDICAID

## 2024-09-17 PROCEDURE — S5100 ADULT DAYCARE SERVICES 15MIN: HCPCS

## 2024-09-18 ENCOUNTER — HOSPITAL ENCOUNTER (OUTPATIENT)
Dept: REHABILITATION | Facility: CLINIC | Age: 74
Discharge: HOME | End: 2024-09-18
Attending: PSYCHIATRY & NEUROLOGY
Payer: MEDICAID

## 2024-09-18 PROCEDURE — S5100 ADULT DAYCARE SERVICES 15MIN: HCPCS

## 2024-09-19 ENCOUNTER — HOSPITAL ENCOUNTER (OUTPATIENT)
Dept: REHABILITATION | Facility: CLINIC | Age: 74
Discharge: HOME | End: 2024-09-19
Attending: PSYCHIATRY & NEUROLOGY
Payer: MEDICAID

## 2024-09-19 PROCEDURE — S5100 ADULT DAYCARE SERVICES 15MIN: HCPCS

## 2024-09-25 ENCOUNTER — HOSPITAL ENCOUNTER (OUTPATIENT)
Dept: REHABILITATION | Facility: CLINIC | Age: 74
Discharge: HOME | End: 2024-09-25
Attending: PSYCHIATRY & NEUROLOGY
Payer: MEDICAID

## 2024-09-25 PROCEDURE — S5100 ADULT DAYCARE SERVICES 15MIN: HCPCS

## 2024-09-26 ENCOUNTER — HOSPITAL ENCOUNTER (OUTPATIENT)
Dept: REHABILITATION | Facility: CLINIC | Age: 74
Discharge: HOME | End: 2024-09-26
Attending: PSYCHIATRY & NEUROLOGY
Payer: MEDICAID

## 2024-09-26 PROCEDURE — S5100 ADULT DAYCARE SERVICES 15MIN: HCPCS

## 2024-10-01 ENCOUNTER — LAB REQUISITION (OUTPATIENT)
Dept: LAB | Facility: HOSPITAL | Age: 74
End: 2024-10-01
Payer: COMMERCIAL

## 2024-10-01 DIAGNOSIS — Z01.89 ENCOUNTER FOR OTHER SPECIFIED SPECIAL EXAMINATIONS: ICD-10-CM

## 2024-10-01 DIAGNOSIS — E03.9 HYPOTHYROIDISM, UNSPECIFIED: ICD-10-CM

## 2024-10-01 LAB
ANION GAP SERPL CALCULATED.3IONS-SCNC: 7 MMOL/L (ref 7–15)
BASOPHILS # BLD AUTO: 0 10E3/UL (ref 0–0.2)
BASOPHILS NFR BLD AUTO: 1 %
BUN SERPL-MCNC: 32.4 MG/DL (ref 8–23)
CALCIUM SERPL-MCNC: 9.1 MG/DL (ref 8.8–10.4)
CHLORIDE SERPL-SCNC: 105 MMOL/L (ref 98–107)
CREAT SERPL-MCNC: 0.69 MG/DL (ref 0.51–0.95)
EGFRCR SERPLBLD CKD-EPI 2021: >90 ML/MIN/1.73M2
EOSINOPHIL # BLD AUTO: 0.2 10E3/UL (ref 0–0.7)
EOSINOPHIL NFR BLD AUTO: 2 %
ERYTHROCYTE [DISTWIDTH] IN BLOOD BY AUTOMATED COUNT: 13.9 % (ref 10–15)
GLUCOSE SERPL-MCNC: 86 MG/DL (ref 70–99)
HCO3 SERPL-SCNC: 30 MMOL/L (ref 22–29)
HCT VFR BLD AUTO: 38 % (ref 35–47)
HGB BLD-MCNC: 11.5 G/DL (ref 11.7–15.7)
IMM GRANULOCYTES # BLD: 0 10E3/UL
IMM GRANULOCYTES NFR BLD: 0 %
LYMPHOCYTES # BLD AUTO: 1.6 10E3/UL (ref 0.8–5.3)
LYMPHOCYTES NFR BLD AUTO: 23 %
MCH RBC QN AUTO: 31.1 PG (ref 26.5–33)
MCHC RBC AUTO-ENTMCNC: 30.3 G/DL (ref 31.5–36.5)
MCV RBC AUTO: 103 FL (ref 78–100)
MONOCYTES # BLD AUTO: 0.5 10E3/UL (ref 0–1.3)
MONOCYTES NFR BLD AUTO: 7 %
NEUTROPHILS # BLD AUTO: 4.6 10E3/UL (ref 1.6–8.3)
NEUTROPHILS NFR BLD AUTO: 67 %
NRBC # BLD AUTO: 0 10E3/UL
NRBC BLD AUTO-RTO: 0 /100
PLATELET # BLD AUTO: 202 10E3/UL (ref 150–450)
POTASSIUM SERPL-SCNC: 4.2 MMOL/L (ref 3.4–5.3)
RBC # BLD AUTO: 3.7 10E6/UL (ref 3.8–5.2)
SODIUM SERPL-SCNC: 142 MMOL/L (ref 135–145)
T4 FREE SERPL-MCNC: 1.02 NG/DL (ref 0.9–1.7)
TSH SERPL DL<=0.005 MIU/L-ACNC: 0.67 UIU/ML (ref 0.3–4.2)
WBC # BLD AUTO: 6.8 10E3/UL (ref 4–11)

## 2024-10-01 PROCEDURE — 80048 BASIC METABOLIC PNL TOTAL CA: CPT | Mod: ORL

## 2024-10-01 PROCEDURE — 84443 ASSAY THYROID STIM HORMONE: CPT | Mod: ORL

## 2024-10-01 PROCEDURE — 84439 ASSAY OF FREE THYROXINE: CPT | Mod: ORL

## 2024-10-01 PROCEDURE — 84480 ASSAY TRIIODOTHYRONINE (T3): CPT | Mod: ORL

## 2024-10-01 PROCEDURE — 85025 COMPLETE CBC W/AUTO DIFF WBC: CPT | Mod: ORL

## 2024-10-02 LAB — T3 SERPL-MCNC: 95 NG/DL (ref 85–202)
